# Patient Record
Sex: FEMALE | Race: WHITE | NOT HISPANIC OR LATINO | Employment: FULL TIME | URBAN - METROPOLITAN AREA
[De-identification: names, ages, dates, MRNs, and addresses within clinical notes are randomized per-mention and may not be internally consistent; named-entity substitution may affect disease eponyms.]

---

## 2018-05-03 ENCOUNTER — HISTORICAL (OUTPATIENT)
Dept: LAB | Facility: HOSPITAL | Age: 48
End: 2018-05-03

## 2018-05-03 LAB
ERYTHROCYTE [SEDIMENTATION RATE] IN BLOOD: 5 MM/HR (ref 0–20)
TSH SERPL-ACNC: 5.98 MIU/ML (ref 0.35–3.75)
URATE SERPL-MCNC: 4.4 MG/DL (ref 2.6–7.2)

## 2018-09-25 ENCOUNTER — HISTORICAL (OUTPATIENT)
Dept: LAB | Facility: HOSPITAL | Age: 48
End: 2018-09-25

## 2018-09-25 LAB — TSH SERPL-ACNC: 5.09 MIU/ML (ref 0.35–3.75)

## 2019-02-27 ENCOUNTER — HISTORICAL (OUTPATIENT)
Dept: LAB | Facility: HOSPITAL | Age: 49
End: 2019-02-27

## 2019-02-27 LAB — TSH SERPL-ACNC: 2.65 MIU/ML (ref 0.35–3.75)

## 2019-05-13 ENCOUNTER — HISTORICAL (OUTPATIENT)
Dept: RESPIRATORY THERAPY | Facility: HOSPITAL | Age: 49
End: 2019-05-13

## 2019-09-06 ENCOUNTER — HISTORICAL (OUTPATIENT)
Dept: LAB | Facility: HOSPITAL | Age: 49
End: 2019-09-06

## 2019-09-06 LAB — TSH SERPL-ACNC: 1.12 MIU/ML (ref 0.35–3.75)

## 2020-01-02 ENCOUNTER — HISTORICAL (OUTPATIENT)
Dept: RADIOLOGY | Facility: HOSPITAL | Age: 50
End: 2020-01-02

## 2020-06-05 ENCOUNTER — HISTORICAL (OUTPATIENT)
Dept: ADMINISTRATIVE | Facility: HOSPITAL | Age: 50
End: 2020-06-05

## 2020-06-05 LAB
ABS NEUT (OLG): 5.51 X10(3)/MCL (ref 2.1–9.2)
APTT PPP: 25.7 SEC (ref 23.4–34.9)
BASOPHILS # BLD AUTO: 0.03 X10(3)/MCL (ref 0–0.2)
BASOPHILS NFR BLD AUTO: 0.3 % (ref 0–1)
BUN SERPL-MCNC: 11.3 MG/DL (ref 9.8–20.1)
CALCIUM SERPL-MCNC: 9.8 MG/DL (ref 8.4–10.2)
CHLORIDE SERPL-SCNC: 103 MMOL/L (ref 98–107)
CO2 SERPL-SCNC: 30 MMOL/L (ref 22–29)
CREAT SERPL-MCNC: 0.65 MG/DL (ref 0.57–1.11)
CREAT/UREA NIT SERPL: 17
EOSINOPHIL # BLD AUTO: 0.06 X10(3)/MCL (ref 0–0.9)
EOSINOPHIL NFR BLD AUTO: 0.6 % (ref 0–6.4)
ERYTHROCYTE [DISTWIDTH] IN BLOOD BY AUTOMATED COUNT: 13.8 % (ref 11.5–17)
GLUCOSE SERPL-MCNC: 97 MG/DL (ref 74–100)
HCT VFR BLD AUTO: 43.2 % (ref 37–47)
HGB BLD-MCNC: 14.4 GM/DL (ref 12–16)
IMM GRANULOCYTES # BLD AUTO: 0.03 10*3/UL (ref 0–0.02)
IMM GRANULOCYTES NFR BLD AUTO: 0.3 % (ref 0–0.43)
INR PPP: 1 (ref 2–3)
LYMPHOCYTES # BLD AUTO: 2.9 X10(3)/MCL (ref 0.6–4.6)
LYMPHOCYTES NFR BLD AUTO: 31.2 % (ref 16–44)
MCH RBC QN AUTO: 33 PG (ref 27–31)
MCHC RBC AUTO-ENTMCNC: 33.3 GM/DL (ref 33–36)
MCV RBC AUTO: 99.1 FL (ref 80–94)
MONOCYTES # BLD AUTO: 0.77 X10(3)/MCL (ref 0.1–1.3)
MONOCYTES NFR BLD AUTO: 8.3 % (ref 4–12.1)
NEUTROPHILS # BLD AUTO: 5.51 X10(3)/MCL (ref 2.1–9.2)
NEUTROPHILS NFR BLD AUTO: 59.3 % (ref 43–73)
NRBC BLD AUTO-RTO: 0 % (ref 0–0.2)
PLATELET # BLD AUTO: 238 X10(3)/MCL (ref 130–400)
PMV BLD AUTO: 9.8 FL (ref 7.4–10.4)
POTASSIUM SERPL-SCNC: 4.4 MMOL/L (ref 3.5–5.1)
PROTHROMBIN TIME: 12.9 SEC (ref 11.7–14.5)
RBC # BLD AUTO: 4.36 X10(6)/MCL (ref 4.2–5.4)
SODIUM SERPL-SCNC: 139 MMOL/L (ref 136–145)
WBC # SPEC AUTO: 9.3 X10(3)/MCL (ref 4.5–11.5)

## 2020-06-10 ENCOUNTER — HISTORICAL (OUTPATIENT)
Dept: SURGERY | Facility: HOSPITAL | Age: 50
End: 2020-06-10

## 2020-07-02 ENCOUNTER — HISTORICAL (OUTPATIENT)
Dept: ADMINISTRATIVE | Facility: HOSPITAL | Age: 50
End: 2020-07-02

## 2020-07-23 ENCOUNTER — HISTORICAL (OUTPATIENT)
Dept: OCCUPATIONAL THERAPY | Facility: HOSPITAL | Age: 50
End: 2020-07-23

## 2020-07-27 ENCOUNTER — HISTORICAL (OUTPATIENT)
Dept: OCCUPATIONAL THERAPY | Facility: HOSPITAL | Age: 50
End: 2020-07-27

## 2020-07-30 ENCOUNTER — HISTORICAL (OUTPATIENT)
Dept: ADMINISTRATIVE | Facility: HOSPITAL | Age: 50
End: 2020-07-30

## 2020-08-05 ENCOUNTER — HISTORICAL (OUTPATIENT)
Dept: OCCUPATIONAL THERAPY | Facility: HOSPITAL | Age: 50
End: 2020-08-05

## 2020-08-07 ENCOUNTER — HISTORICAL (OUTPATIENT)
Dept: OCCUPATIONAL THERAPY | Facility: HOSPITAL | Age: 50
End: 2020-08-07

## 2020-08-10 ENCOUNTER — HISTORICAL (OUTPATIENT)
Dept: OCCUPATIONAL THERAPY | Facility: HOSPITAL | Age: 50
End: 2020-08-10

## 2020-08-12 ENCOUNTER — HISTORICAL (OUTPATIENT)
Dept: OCCUPATIONAL THERAPY | Facility: HOSPITAL | Age: 50
End: 2020-08-12

## 2020-08-14 ENCOUNTER — HISTORICAL (OUTPATIENT)
Dept: OCCUPATIONAL THERAPY | Facility: HOSPITAL | Age: 50
End: 2020-08-14

## 2020-08-17 ENCOUNTER — HISTORICAL (OUTPATIENT)
Dept: OCCUPATIONAL THERAPY | Facility: HOSPITAL | Age: 50
End: 2020-08-17

## 2020-08-19 ENCOUNTER — HISTORICAL (OUTPATIENT)
Dept: OCCUPATIONAL THERAPY | Facility: HOSPITAL | Age: 50
End: 2020-08-19

## 2020-08-21 ENCOUNTER — HISTORICAL (OUTPATIENT)
Dept: OCCUPATIONAL THERAPY | Facility: HOSPITAL | Age: 50
End: 2020-08-21

## 2020-08-24 ENCOUNTER — HISTORICAL (OUTPATIENT)
Dept: OCCUPATIONAL THERAPY | Facility: HOSPITAL | Age: 50
End: 2020-08-24

## 2020-08-26 ENCOUNTER — HISTORICAL (OUTPATIENT)
Dept: OCCUPATIONAL THERAPY | Facility: HOSPITAL | Age: 50
End: 2020-08-26

## 2020-08-31 ENCOUNTER — HISTORICAL (OUTPATIENT)
Dept: OCCUPATIONAL THERAPY | Facility: HOSPITAL | Age: 50
End: 2020-08-31

## 2020-09-02 ENCOUNTER — HISTORICAL (OUTPATIENT)
Dept: LAB | Facility: HOSPITAL | Age: 50
End: 2020-09-02

## 2020-09-02 ENCOUNTER — HISTORICAL (OUTPATIENT)
Dept: OCCUPATIONAL THERAPY | Facility: HOSPITAL | Age: 50
End: 2020-09-02

## 2020-09-02 LAB
CHOLEST SERPL-MCNC: 192 MG/DL
CHOLEST/HDLC SERPL: 5 {RATIO} (ref 0–5)
HDLC SERPL-MCNC: 40 MG/DL (ref 35–60)
LDLC SERPL CALC-MCNC: 130 MG/DL (ref 50–140)
TRIGL SERPL-MCNC: 110 MG/DL (ref 37–140)
TSH SERPL-ACNC: 3.05 UIU/ML (ref 0.35–4.94)
VLDLC SERPL CALC-MCNC: 22 MG/DL

## 2020-09-09 ENCOUNTER — HISTORICAL (OUTPATIENT)
Dept: OCCUPATIONAL THERAPY | Facility: HOSPITAL | Age: 50
End: 2020-09-09

## 2020-09-11 ENCOUNTER — HISTORICAL (OUTPATIENT)
Dept: OCCUPATIONAL THERAPY | Facility: HOSPITAL | Age: 50
End: 2020-09-11

## 2020-09-14 ENCOUNTER — HISTORICAL (OUTPATIENT)
Dept: OCCUPATIONAL THERAPY | Facility: HOSPITAL | Age: 50
End: 2020-09-14

## 2020-09-15 ENCOUNTER — HISTORICAL (OUTPATIENT)
Dept: ADMINISTRATIVE | Facility: HOSPITAL | Age: 50
End: 2020-09-15

## 2020-09-16 ENCOUNTER — HISTORICAL (OUTPATIENT)
Dept: OCCUPATIONAL THERAPY | Facility: HOSPITAL | Age: 50
End: 2020-09-16

## 2020-09-18 ENCOUNTER — HISTORICAL (OUTPATIENT)
Dept: OCCUPATIONAL THERAPY | Facility: HOSPITAL | Age: 50
End: 2020-09-18

## 2020-09-21 ENCOUNTER — HISTORICAL (OUTPATIENT)
Dept: OCCUPATIONAL THERAPY | Facility: HOSPITAL | Age: 50
End: 2020-09-21

## 2020-09-23 ENCOUNTER — HISTORICAL (OUTPATIENT)
Dept: OCCUPATIONAL THERAPY | Facility: HOSPITAL | Age: 50
End: 2020-09-23

## 2020-09-25 ENCOUNTER — HISTORICAL (OUTPATIENT)
Dept: OCCUPATIONAL THERAPY | Facility: HOSPITAL | Age: 50
End: 2020-09-25

## 2020-09-30 ENCOUNTER — HISTORICAL (OUTPATIENT)
Dept: OCCUPATIONAL THERAPY | Facility: HOSPITAL | Age: 50
End: 2020-09-30

## 2021-01-18 ENCOUNTER — HISTORICAL (OUTPATIENT)
Dept: RADIOLOGY | Facility: HOSPITAL | Age: 51
End: 2021-01-18

## 2021-03-05 ENCOUNTER — HISTORICAL (OUTPATIENT)
Dept: LAB | Facility: HOSPITAL | Age: 51
End: 2021-03-05

## 2021-03-05 LAB
ABS NEUT (OLG): 4.22
ALBUMIN SERPL-MCNC: 3.8 GM/DL (ref 3.5–5)
ALBUMIN/GLOB SERPL: 1.4 RATIO (ref 1.1–2)
ALP SERPL-CCNC: 78 UNIT/L (ref 40–150)
ALT SERPL-CCNC: 48 UNIT/L (ref 0–55)
AST SERPL-CCNC: 27 UNIT/L (ref 5–34)
BASOPHILS # BLD AUTO: 0.01 X10(3)/MCL
BASOPHILS NFR BLD AUTO: 0.1 %
BILIRUB SERPL-MCNC: 0.4 MG/DL
BILIRUBIN DIRECT+TOT PNL SERPL-MCNC: 0.2 MG/DL
BILIRUBIN DIRECT+TOT PNL SERPL-MCNC: 0.2 MG/DL (ref 0–0.5)
BUN SERPL-MCNC: 13 MG/DL (ref 9.8–20.1)
CALCIUM SERPL-MCNC: 8.9 MG/DL (ref 8.4–10.2)
CHLORIDE SERPL-SCNC: 109 MMOL/L (ref 98–107)
CHOLEST SERPL-MCNC: 169 MG/DL
CHOLEST/HDLC SERPL: 4 {RATIO} (ref 0–5)
CO2 SERPL-SCNC: 27 MEQ/L (ref 22–29)
CREAT SERPL-MCNC: 0.59 MG/DL (ref 0.55–1.02)
EOSINOPHIL # BLD AUTO: 0.09 X10(3)/MCL
EOSINOPHIL NFR BLD AUTO: 1.2 %
ERYTHROCYTE [DISTWIDTH] IN BLOOD BY AUTOMATED COUNT: 14 %
GLOBULIN SER-MCNC: 2.7 GM/DL (ref 2.4–3.5)
GLUCOSE SERPL-MCNC: 107 MG/DL (ref 74–100)
HCT VFR BLD AUTO: 41.6 % (ref 34–46)
HDLC SERPL-MCNC: 41 MG/DL (ref 35–60)
HGB BLD-MCNC: 13.2 GM/DL (ref 11.3–15.4)
IMM GRANULOCYTES # BLD AUTO: 0.02 10*3/UL (ref 0–0.1)
IMM GRANULOCYTES NFR BLD AUTO: 0.3 % (ref 0–1)
LDLC SERPL CALC-MCNC: 115 MG/DL (ref 50–140)
LYMPHOCYTES # BLD AUTO: 2.47 X10(3)/MCL
LYMPHOCYTES NFR BLD AUTO: 33.6 %
MCH RBC QN AUTO: 31.3 PG (ref 27–33)
MCHC RBC AUTO-ENTMCNC: 31.7 GM/DL (ref 32–35)
MCV RBC AUTO: 98.6 FL (ref 81–97)
MONOCYTES # BLD AUTO: 0.54 X10(3)/MCL
MONOCYTES NFR BLD AUTO: 7.3 %
NEUTROPHILS # BLD AUTO: 4.22 X10(3)/MCL
NEUTROPHILS NFR BLD AUTO: 57.5 %
PLATELET # BLD AUTO: 244 X10(3)/MCL (ref 140–450)
PMV BLD AUTO: 10 FL
POTASSIUM SERPL-SCNC: 4.3 MMOL/L (ref 3.5–5.1)
PROT SERPL-MCNC: 6.5 GM/DL (ref 6.4–8.3)
RBC # BLD AUTO: 4.22 X10(6)/MCL (ref 3.9–5)
SODIUM SERPL-SCNC: 141 MMOL/L (ref 136–145)
TSH SERPL-ACNC: 1.41 UIU/ML (ref 0.35–4.94)
VLDLC SERPL CALC-MCNC: 13 MG/DL
WBC # SPEC AUTO: 7.35 X10(3)/MCL (ref 3.4–9.2)

## 2021-06-04 ENCOUNTER — HISTORICAL (OUTPATIENT)
Dept: LAB | Facility: HOSPITAL | Age: 51
End: 2021-06-04

## 2021-06-04 LAB — TSH SERPL-ACNC: 1.7 UIU/ML (ref 0.35–4.94)

## 2022-02-02 ENCOUNTER — HISTORICAL (OUTPATIENT)
Dept: LAB | Facility: HOSPITAL | Age: 52
End: 2022-02-02

## 2022-02-02 LAB — TSH SERPL-ACNC: 0.42 M[IU]/L (ref 0.35–4.94)

## 2022-02-10 ENCOUNTER — HISTORICAL (OUTPATIENT)
Dept: ADMINISTRATIVE | Facility: HOSPITAL | Age: 52
End: 2022-02-10

## 2022-02-10 ENCOUNTER — HISTORICAL (OUTPATIENT)
Dept: RADIOLOGY | Facility: HOSPITAL | Age: 52
End: 2022-02-10

## 2022-02-21 LAB
HUMAN PAPILLOMAVIRUS (HPV): NORMAL
PAP RECOMMENDATION EXT: NORMAL
PAP SMEAR: NORMAL

## 2022-04-11 ENCOUNTER — HISTORICAL (OUTPATIENT)
Dept: ADMINISTRATIVE | Facility: HOSPITAL | Age: 52
End: 2022-04-11
Payer: COMMERCIAL

## 2022-04-27 VITALS
DIASTOLIC BLOOD PRESSURE: 80 MMHG | SYSTOLIC BLOOD PRESSURE: 130 MMHG | WEIGHT: 209.44 LBS | HEIGHT: 61 IN | BODY MASS INDEX: 39.54 KG/M2

## 2022-04-30 NOTE — OP NOTE
DATE OF SURGERY:    06/10/2020    SURGEON:  Brett Moreno MD  ASSISTANT:  Chantel Sampson    PREOPERATIVE DIAGNOSIS:  Left bimalleolar ankle fracture.    POSTOPERATIVE DIAGNOSIS:  Left bimalleolar ankle fracture.    PROCEDURE:  Open reduction internal fixation of left ankle fracture.     A certified assistant with a skilled set of hands was necessary for proper patient positioning, as well as assistance with holding retractors and closure.    IMPLANTS:  Include:  1. Arthrex 4.0 cannulated screws to the medial malleolus.  2. Arthrex 8-hole distal fibular plate to the lateral malleolus with cannulated screws and locking screws.    INDICATIONS FOR SURGERY:  The patient is a 49-year-old female who presented to my office.  She had a fractured ankle, underwent closed reduction in the ER.  The patient had a bimalleolar ankle fracture.  We discussed operative indications versus nonoperative treatment options.  The risks, benefits, and alternatives operative intervention were discussed in detail with the patient.  All questions were answered.  No guarantees were made.  Despite these risks, she elected to proceed with surgical intervention.    PROCEDURE IN DETAIL:  The patient was seen in the preop holding area, was marked and consented for surgery.  Taken to the operating room, placed under general endotracheal anesthesia.  The left lower extremity was prepped and draped in normal sterile fashion.  Time-out was performed, verifying correct patient, site, side, and procedure.  All were in agreement.  The leg was exsanguinated using an Esmarch tourniquet.  An incision was made over the medial malleolus, taken directly down to bone, spread down to the fracture.  We cleaned the fracture site out using irrigation, as well as curettes and a rongeur.  We were able to reduce the fracture anatomically while looking at the anterior medial ankle joint with a point-to-point clamp.  Clamp held the fracture in position and  appropriate reduction.  Two K-wires were placed across the fracture.  We drilled the near cortex and then we measured and placed 2 cannulated screws across the fracture, tightened sequentially to compress the fracture.  Released the point-to-point clamp.  Fracture alignment was maintained in anatomic position.  We irrigated this wound.  We closed this with 2-0 Vicryl and 3-0 nylon.       We then turned our attention toward preparation of the lateral malleolus.  Using fluoroscopic guidance, we made an incision overlying the lateral malleolus, taken directly down to bone.  Fracture was a distal fibula fracture that was transverse with a very long posterolateral spike.  This was unable to be anatomically reduced and unable to be lagged given the comminution of the fracture.  Therefore, it was bridged.  A distal third tibial plate was then placed in appropriate position.  We put 3 screws distally in locking fashion to hold the plate down.  We held traction on the plate, reduced the fibula, restored anatomic length judged on fluoroscopic views, as well as looking at the fracture.  We then placed a screw in the shaft, sucking the plate down, pushing it laterally as well.  We placed two more screws in the shaft proximally to the fracture.  Mortise was checked.  It was anatomic.  We evaluated the syndesmosis with a stress test and it was negative.  We then turned our attention towards closure.  We closed with 2-0 Vicryl and 3-0 nylon in interrupted fashion.  The patient was then placed in a well-padded short-leg splint.  She will be discharged home and will follow up with me in 2 weeks.        ______________________________  Brett Moreno MD    PCB/UD  DD:  06/10/2020  Time:  04:24PM  DT:  06/10/2020  Time:  04:54PM  Job #:  [id number]

## 2022-05-05 NOTE — HISTORICAL [facility] CERNER
This is a historical note converted from Oumar. Formatting and pictures may have been removed.  Please reference Oumar for original formatting and attached multimedia. Chief Complaint  6 WEEK F/U ORIF LEFT ANKLE SX ON 6/10/20 [facility]. HERE TODAY FOR X-RAYS.  History of Present Illness  49-year-old male presents here follow-up of open reduction and?fixation of her left ankle.? Patient is about 3 months out. ?Continues to have some issues. ?Has pain. ?Uses crutches for ambulation. ?Notes ongoing swelling in the ankle.  Review of Systems  Denies fevers, chills, chest pain, shortness of breath. Comprehensive review of systems performed and otherwise negative except as noted in HPI.  Physical Exam  Vitals & Measurements  T:?98.2? ?F (Oral)? BP:?123/72?  HT:?154.00?cm? WT:?92.000?kg? BMI:?38.79?  General: No acute distress, alert and oriented, healthy appearing?  HEENT: Head is atraumatic, mucous membranes are moist  Cardiovascular: Brisk capillary refill  Lungs: Breathing non-labored  Skin: no rashes appreciated  Neurologic: Sensation is grossly intact distally  ?   Left ankle:  Patient is incisions of healed well. ?Does have some swelling about her medial and lateral incision.? Brisk cap refill distally. ?Dorsiflexion?to 10. ?Plantar flexion to 20.  Assessment/Plan  1.?Closed bimalleolar fracture of left ankle?S82.842A  ?Patient status post ORIF of her left ankle.? Her fracture has healed however she continues to have pain and issues. ?Appears to be mainly coming from her ankle joint. ?May be having some synovitis versus early arthritic change. ?We will put her on some anti-inflammatories. ?Recommend continued physical therapy for stretching of her Achilles. ?If she continues to have issues we will get her set up for an injection?into her left ankle.  Ordered:  meloxicam, 15 mg = 1 tab(s), Oral, Daily, # 30 tab(s), 0 Refill(s), Pharmacy: [facility], 154, cm, Height/Length Dosing, 09/15/20 16:04:00  CDT, 92, kg, Weight Dosing, 09/15/20 16:04:00 CDT  Clinic Follow up, *Est. 10/27/20 3:00:00 CDT, Order for future visit, [facility]  Post-Op follow-up visit [id number] PC, Closed bimalleolar fracture of left ankle, [facility], 09/15/20 16:53:00 CDT  Post-Op follow-up visit [id number] PC, Closed bimalleolar fracture of left ankle, [facility], 09/15/20 16:38:00 CDT  XR Ankle Left Minimum 3 Views, Routine, 09/15/20 16:38:00 CDT, None, Stretcher, Patient Has IV?, Rad Type, Closed bimalleolar fracture of left ankle, Not Scheduled, 09/15/20 16:38:00 CDT  ?  Orders:  Physical Therapy Additional Tx, 09/25/20 14:00:00 CDT, Stop date 09/25/20 14:00:00 CDT  Referrals  Clinic Follow up, *Est. 10/27/20 3:00:00 CDT, Order for future visit, [facility]   Problem List/Past Medical History  Ongoing  Anxiety  Bone spur  Closed bimalleolar fracture of left ankle  Hypothyroid  Obesity  NATHANAEL - Obstructive sleep apnea  Historical  No qualifying data  Procedure/Surgical History  Open treatment of bimalleolar ankle fracture (eg, lateral and medial malleoli, or lateral and posterior malleoli, or medial and posterior malleoli), includes internal fixation, when performed (06/10/2020)  ORIF Ankle (Left) (06/10/2020)  Reposition Left Fibula with Internal Fixation Device, Open Approach (06/10/2020)  Reposition Left Tibia with Internal Fixation Device, Open Approach (06/10/2020)  Closed treatment of bimalleolar ankle fracture (eg, lateral and medial malleoli, or lateral and posterior malleoli or medial and posterior malleoli); with manipulation (06/04/2020)  Reposition Left Tibia, External Approach (06/04/2020)  Excision of lesion, tendon, tendon sheath, or capsule (including synovectomy) (eg, cyst or ganglion); foot. (06/21/2012)  Other local excision or destruction of lesion of joint of foot and toe (06/21/2012)  Appendectomy  LASIK  Tubal  ligation   Medications  alPRAzolam 0.25 mg oral tab, 0.25 mg= 1 tab(s), Oral, Daily  Effexor XR 75 mg oral capsule, extended release, 75 mg= 1 cap(s), Oral, Daily, 3 refills  levothyroxine 88 mcg (0.088 mg) oral tablet, 88 mcg= 1 tab(s), Oral, Daily, 3 refills  Mobic 15 mg oral tablet, 15 mg= 1 tab(s), Oral, Daily  Allergies  No Known Allergies  Social History  Abuse/Neglect  No, No, Yes, 09/15/2020  Alcohol  Employment/School  Employed, Work/School description: ., 05/06/2019  Exercise  Exercise duration: 0., 05/06/2019  Financial/Legal Situation  None, 09/01/2020  Home/Environment  Lives with Spouse., 05/06/2019    Never in , 09/01/2020  Nutrition/Health  Regular, 05/06/2019  Sexual  Sexually active: Yes., 05/06/2019  Spiritual/Cultural  Mu-ism, 10/28/2019  Substance Use  Never, 05/06/2019  Tobacco  10 or more cigarettes (1/2 pack or more)/day in last 30 days, Cigarettes, No, Household tobacco concerns: No. 17 Years (Age started)., 09/15/2020  Family History  Family history is negative  Immunizations  Vaccine Date Status   influenza virus vaccine, inactivated 10/29/2019 Recorded   Health Maintenance  Health Maintenance  ???Pending?(in the next year)  ??? ??OverDue  ??? ? ? ?Influenza Vaccine due??and every?  ??? ??Due?  ??? ? ? ?Colorectal Screening due??09/15/20??and every?  ??? ? ? ?Tetanus Vaccine due??09/15/20??and every 10??year(s)  ??? ? ? ?Zoster Vaccine due??09/15/20??and every?  ??? ??Due In Future?  ??? ? ? ?Obesity Screening not due until??01/01/21??and every 1??year(s)  ??? ? ? ?Smoking Cessation not due until??01/01/21??and every 1??year(s)  ??? ? ? ?Alcohol Misuse Screening not due until??01/02/21??and every 1??year(s)  ??? ? ? ?Depression Screening not due until??09/01/21??and every 1??year(s)  ??? ? ? ?ADL Screening not due until??09/01/21??and every 1??year(s)  ???Satisfied?(in the past 1 year)  ??? ??Satisfied?  ??? ? ? ?ADL Screening on??09/01/20.??Satisfied by  Renita Bobby LPN  ??? ? ? ?Alcohol Misuse Screening on??06/05/20.??Satisfied by Buffy Kuhn  ??? ? ? ?Blood Pressure Screening on??09/15/20.??Satisfied by Niki Cottrell  ??? ? ? ?Body Mass Index Check on??09/15/20.??Satisfied by Niki Cottrell  ??? ? ? ?Breast Cancer Screening on??01/02/20.??Satisfied by Martha Pabon  ??? ? ? ?Cervical Cancer Screening on??01/02/20.??Satisfied by Deysi Barnes  ??? ? ? ?Depression Screening on??09/01/20.??Satisfied by Renita Bobby LPN  ??? ? ? ?Diabetes Screening on??06/05/20.??Satisfied by Lo Ibarra  ??? ? ? ?Influenza Vaccine on??10/29/19.??Satisfied by Renita Bobby LPN  ??? ? ? ?Lipid Screening on??09/02/20.??Satisfied by Tiki Marie  ??? ? ? ?Obesity Screening on??09/15/20.??Satisfied by Niki Cottrell  ??? ? ? ?Smoking Cessation on??07/30/20.??Satisfied by Niki Cottrell  ?  Diagnostic Results  AP lateral left ankle reviewed. ?Patients fracture alignment is maintained. ?Interval callus and consolidation of her fracture seen. ?No loosening seen.

## 2022-05-05 NOTE — HISTORICAL [facility] CERNER
This is a historical note converted from Oumar. Formatting and pictures may have been removed.  Please reference Oumar for original formatting and attached multimedia. Chief Complaint  ORIF left ankle 6.10.20 gl 9.8.20. pt walking boot at all times. pt states she is doing well. She would like to discuss wearing a shorter walking boot,  History of Present Illness  49-year-old?female presents here for follow-up after bilateral ankle fracture fixation. ?Patient 3 weeks out. ?Overall she is doing fairly well. ?Has maintained nonweightbearing restrictions the left ankle. ?Pain is controlled and she is happy with her recovery thus far. ?She is using a?knee scooter currently.  Review of Systems  Denies fevers, chills, chest pain, shortness of breath. Comprehensive review of systems performed and otherwise negative except as noted in HPI.  Physical Exam  Vitals & Measurements  T:?97.2? ?F (Oral)?  HT:?154.94?cm? WT:?92.98?kg? BMI:?38.73?  General: No acute distress, alert and oriented, healthy appearing?  HEENT: Head is atraumatic, mucous membranes are moist  Cardiovascular: Brisk capillary refill  Lungs: Breathing non-labored  Skin: no rashes appreciated  Neurologic: Sensation is grossly intact distally  ?  Left ankle:  Brisk cap refill disappeared sensation tach disappeared range of motion of the left ankle is somewhat limited. ?She overall is developed an equinus contracture?and is?10 degrees?from neutral in terms of dorsiflexion.  Assessment/Plan  1.?Closed bimalleolar fracture of left ankle?S82.842A  ?Patients incisions have healed well. ?We will discontinue sutures today. ?I instructed on range of motion exercises to her left ankle she can begin.? We will also get her started with some physical therapy to work on range of motion given her significant?plantarflexion?contracture.? Plan to repeat an x-ray in another month. ?Continue nonweightbearing.   Problem List/Past Medical History  Ongoing  Anxiety  Bone  spur  Closed bimalleolar fracture of left ankle  Hypothyroid  Obesity  NATHANAEL - Obstructive sleep apnea  Historical  No qualifying data  Procedure/Surgical History  Open treatment of bimalleolar ankle fracture (eg, lateral and medial malleoli, or lateral and posterior malleoli, or medial and posterior malleoli), includes internal fixation, when performed (06/10/2020)  ORIF Ankle (Left) (06/10/2020)  Reposition Left Fibula with Internal Fixation Device, Open Approach (06/10/2020)  Reposition Left Tibia with Internal Fixation Device, Open Approach (06/10/2020)  Closed treatment of bimalleolar ankle fracture (eg, lateral and medial malleoli, or lateral and posterior malleoli or medial and posterior malleoli); with manipulation (06/04/2020)  Reposition Left Tibia, External Approach (06/04/2020)  Excision of lesion, tendon, tendon sheath, or capsule (including synovectomy) (eg, cyst or ganglion); foot. (06/21/2012)  Other local excision or destruction of lesion of joint of foot and toe (06/21/2012)  Appendectomy  LASIK  Tubal ligation   Medications  acetaminophen-hydrocodone 325 mg-5 mg oral tablet, 1 tab(s), Oral, q6hr,? ?Not Taking, Completed Rx  alPRAzolam 0.25 mg oral tab, 0.25 mg= 1 tab(s), Oral, Daily  aspirin 81 mg oral Delayed Release (EC) tablet, 81 mg= 1 tab(s), Oral, Daily,? ?Not Taking per Prescriber: post op medication  Effexor XR 75 mg oral capsule, extended release, 75 mg= 1 cap(s), Oral, Daily, 3 refills  levothyroxine 88 mcg (0.088 mg) oral tablet, 88 mcg= 1 tab(s), Oral, Daily, 3 refills  Norco 7.5 mg-325 mg oral tablet, 1 tab(s), Oral, q4hr, PRN,? ?Not Taking, Completed Rx  Allergies  No Known Allergies  Social History  Abuse/Neglect  No, No, Yes, 07/02/2020  No, No, Yes, 06/10/2020  Alcohol  Employment/School  Employed, Work/School description: ., 05/06/2019  Exercise  Exercise duration: 0., 05/06/2019  Home/Environment  Lives with Spouse., 05/06/2019  Nutrition/Health  Regular,  05/06/2019  Sexual  Sexually active: Yes., 05/06/2019  Spiritual/Cultural  Mormon, 10/28/2019  Substance Use  Never, 05/06/2019  Tobacco  10 or more cigarettes (1/2 pack or more)/day in last 30 days, No, 07/02/2020  10 or more cigarettes (1/2 pack or more)/day in last 30 days, Cigarettes, No, 17 Years (Age started)., 06/10/2020  Family History  Family history is negative  Immunizations  Vaccine Date Status   influenza virus vaccine, inactivated 10/29/2019 Recorded   Health Maintenance  Health Maintenance  ???Pending?(in the next year)  ??? ??OverDue  ??? ? ? ?Diabetes Screening due??and every?  ??? ? ? ?Smoking Cessation due??01/01/20??Variable frequency  ??? ? ? ?Lipid Screening due??03/10/20??and every?  ??? ? ? ?ADL Screening due??06/06/20??and every 1??year(s)  ??? ??Due?  ??? ? ? ?Colorectal Screening due??07/02/20??and every?  ??? ? ? ?Tetanus Vaccine due??07/02/20??and every 10??year(s)  ??? ??Due In Future?  ??? ? ? ?Alcohol Misuse Screening not due until??01/01/21??and every 1??year(s)  ??? ? ? ?Obesity Screening not due until??01/01/21??and every 1??year(s)  ??? ? ? ?Blood Pressure Screening not due until??06/05/21??and every 1??year(s)  ???Satisfied?(in the past 1 year)  ??? ??Satisfied?  ??? ? ? ?Alcohol Misuse Screening on??06/05/20.??Satisfied by Buffy Kuhn  ??? ? ? ?Blood Pressure Screening on??06/10/20.??Satisfied by Chetna Cox RN  ??? ? ? ?Body Mass Index Check on??07/02/20.??Satisfied by Ro Stein LPN  ??? ? ? ?Breast Cancer Screening on??01/02/20.??Satisfied by Martha Pabon  ??? ? ? ?Cervical Cancer Screening on??01/02/20.??Satisfied by Deysi Barnes  ??? ? ? ?Diabetes Screening on??06/05/20.??Satisfied by Lo Ibarra  ??? ? ? ?Influenza Vaccine on??10/29/19.??Satisfied by Renita Bobby LPN  ??? ? ? ?Obesity Screening on??07/02/20.??Satisfied by Ro Stein LPN.  ?  Diagnostic Results  AP lateral left ankle reviewed. ?Patients fracture alignment is  unchanged. ?Implants in good position.

## 2022-05-05 NOTE — HISTORICAL [facility] CERNER
This is a historical note converted from Oumar. Formatting and pictures may have been removed.  Please reference Oumar for original formatting and attached multimedia. Chief Complaint  LEFT ANKLE DISPLACED FRACTURE WAS TREATED IN THE ER ON 6/4/20. ?SHE STEPPED INTO A HOLE IN THE YARD. ?SHE IS IN A SHORT LEG SPLINT.  History of Present Illness  49-year-old female presents today for evaluation of left foot injury. ?Patient was walking in her?yard?when she stepped in a hole and twisted her left ankle. ?Noted to have a left fracture dislocation in the ER. ?She is undergone close reduction of this.? Currently patient complains of significant pain to the left ankle. ?She has been nonweightbearing. ?Denies any proximal?pain or pain in other extremities. ?Pain is sharp in nature. ?Improved by rest.  Review of Systems  Denies fevers, chills, chest pain, shortness of breath. Comprehensive review of systems performed and otherwise negative except as noted in HPI.  Physical Exam  Vitals & Measurements  T:?98.1? ?F (Oral)? BP:?126/66?  HT:?158?cm? WT:?96?kg? BMI:?38.46?  General: No acute distress, alert and oriented, healthy appearing?  HEENT: Head is atraumatic, mucous membranes are moist  Cardiovascular: Brisk capillary refill  Lungs: Breathing non-labored  Skin: no rashes appreciated  Neurologic: Sensation is grossly intact distally  ?  Left foot:  Patient is in a well-padded short leg splint. ?She has brisk capillary refill distally. ?Positive FHL/EHL.? No significant swelling to the toes. ?No significant swelling to her calf.  Assessment/Plan  1.?Bimalleolar fracture of left ankle?S82.842A  ?Patient with left displaced bimalleolar ankle fracture.? Reduction is adequate although not anatomic.? Instructed the patient on?elevation and she is to have strict elevation for the next?7 to 10 days.? Given the bimalleolar nature of the fracture as well as?displaced nature, patient require operative fixation with ORIF.? The risk,  benefits, alternatives to ORIF of her left ankle were discussed in detail including but limited to infection, bleeding, scarring, need revision surgery, and conversion to pain, DVT, PE, death. ?Despite these risk he like to proceed with surgical invention. ?Plan for ORIF of her left ankle next Wednesday. ?Patient is aware that if she has significant or further swelling?next Wednesday, may require external fixation of her left ankle.? She is to remain strictly nonweightbearing?with strict elevation until surgery.  Orders:  XR Ankle Left Minimum 3 Views, Routine, 06/05/20 9:43:00 CDT, None, Stretcher, Patient Has IV?, Rad Type, Ankle fracture, left, Not Scheduled, 06/05/20 9:43:00 CDT   Problem List/Past Medical History  Ongoing  Anxiety  Bone spur  Hypothyroid  Obesity  Historical  No qualifying data  Procedure/Surgical History  Excision of lesion, tendon, tendon sheath, or capsule (including synovectomy) (eg, cyst or ganglion); foot. (06/21/2012)  Other local excision or destruction of lesion of joint of foot and toe (06/21/2012)  Appendectomy  LASIK  Tubal ligation   Medications  alPRAzolam 0.25 mg oral tab, 0.25 mg= 1 tab(s), Oral, Daily  Effexor XR 75 mg oral capsule, extended release, 75 mg= 1 cap(s), Oral, Daily, 3 refills  levothyroxine 88 mcg (0.088 mg) oral tablet, 88 mcg= 1 tab(s), Oral, Daily, 3 refills  Norco 5 mg-325 mg oral tablet, 1 tab(s), Oral, q4hr, PRN  Allergies  No Known Allergies  Social History  Abuse/Neglect  No, 06/05/2020  Alcohol  Employment/School  Employed, Work/School description: ., 05/06/2019  Exercise  Exercise duration: 0., 05/06/2019  Home/Environment  Lives with Spouse., 05/06/2019  Nutrition/Health  Regular, 05/06/2019  Sexual  Sexually active: Yes., 05/06/2019  Spiritual/Cultural  Evangelical, 10/28/2019  Substance Use  Never, 05/06/2019  Tobacco  10 or more cigarettes (1/2 pack or more)/day in last 30 days, Cigarettes, No, 17 Years (Age started).,  06/05/2020  Family History  Family history is negative  Immunizations  Vaccine Date Status   influenza virus vaccine, inactivated 10/29/2019 Recorded   Health Maintenance  Health Maintenance  ???Pending?(in the next year)  ??? ??OverDue  ??? ? ? ?Diabetes Screening due??and every?  ??? ? ? ?Smoking Cessation due??01/01/20??Variable frequency  ??? ? ? ?Lipid Screening due??03/10/20??and every?  ??? ??Due?  ??? ? ? ?Depression Screening due??05/05/20??and every 1??year(s)  ??? ? ? ?Colorectal Screening due??06/05/20??and every?  ??? ? ? ?Tetanus Vaccine due??06/05/20??and every 10??year(s)  ??? ??Due In Future?  ??? ? ? ?ADL Screening not due until??06/06/20??and every 1??year(s)  ??? ? ? ?Alcohol Misuse Screening not due until??01/01/21??and every 1??year(s)  ??? ? ? ?Obesity Screening not due until??01/01/21??and every 1??year(s)  ???Satisfied?(in the past 1 year)  ??? ??Satisfied?  ??? ? ? ?Alcohol Misuse Screening on??06/05/20.??Satisfied by Buffy Kuhn  ??? ? ? ?Blood Pressure Screening on??06/05/20.??Satisfied by Buffy Kuhn  ??? ? ? ?Body Mass Index Check on??06/05/20.??Satisfied by Bam Buffy  ??? ? ? ?Breast Cancer Screening on??01/02/20.??Satisfied by Martha Pabon  ??? ? ? ?Cervical Cancer Screening on??01/02/20.??Satisfied by Deysi Barnes  ??? ? ? ?Influenza Vaccine on??10/29/19.??Satisfied by Renita Bobby LPN  ??? ? ? ?Obesity Screening on??06/05/20.??Satisfied by Bam, Buffy  ?  Diagnostic Results  AP lateral left ankle reviewed. ?Patients fracture?alignment is adequate although she her talus does continue to be somewhat?laterally shifted.

## 2022-05-05 NOTE — HISTORICAL [facility]
This is a historical note converted from [facility]. Formatting and pictures may have been removed.  Please reference Oumar for original formatting and attached multimedia. Chief Complaint  1 MONTH F/U ORIF LEFT ANKLE ON 6/10/20 GLOBAL 9/8/20. HERE TODAY FOR X-RAYS.  History of Present Illness  49-year-old female presents here follow-up with ORIF of her by bimalleolar ankle fracture. ?Over the patient doing very well. ?Pain is controlled. ?She has been compliant with nonweightbearing restrictions.  Review of Systems  Denies fevers, chills, chest pain, shortness of breath. Comprehensive review of systems performed and otherwise negative except as noted in HPI.  Physical Exam  Vitals & Measurements  T:?97.7? ?F (Oral)? BP:?116/83?  HT:?154.94?cm? WT:?38.980?kg? BMI:?16.24?  General: No acute distress, alert and oriented, healthy appearing?  HEENT: Head is atraumatic, mucous membranes are moist  Cardiovascular: Brisk capillary refill  Lungs: Breathing non-labored  Skin: no rashes appreciated  Neurologic: Sensation is grossly intact distally  ?  Left foot:  Incisions are clean and dry. ?She has brisk cap refill distally. ?Sensation tach distally.? Patient can dorsiflex to neutral. ?Good plantar flexion.  Assessment/Plan  1.?Closed bimalleolar fracture of left ankle?S82.842A  ?Patients fracture is gone on to union. ?We will lowered again weightbearing with physical therapy and wean out of the boot as tolerated. ?We will see her back in 6 weeks with repeat x-rays of her left foot.   Problem List/Past Medical History  Ongoing  Anxiety  Bone spur  Closed bimalleolar fracture of left ankle  Hypothyroid  Obesity  NATHANAEL - Obstructive sleep apnea  Historical  No qualifying data  Procedure/Surgical History  Open treatment of bimalleolar ankle fracture (eg, lateral and medial malleoli, or lateral and posterior malleoli, or medial and posterior malleoli), includes internal fixation, when performed (06/10/2020)  ORIF Ankle (Left)  (06/10/2020)  Reposition Left Fibula with Internal Fixation Device, Open Approach (06/10/2020)  Reposition Left Tibia with Internal Fixation Device, Open Approach (06/10/2020)  Closed treatment of bimalleolar ankle fracture (eg, lateral and medial malleoli, or lateral and posterior malleoli or medial and posterior malleoli); with manipulation (06/04/2020)  Reposition Left Tibia, External Approach (06/04/2020)  Excision of lesion, tendon, tendon sheath, or capsule (including synovectomy) (eg, cyst or ganglion); foot. (06/21/2012)  Other local excision or destruction of lesion of joint of foot and toe (06/21/2012)  Appendectomy  LASIK  Tubal ligation   Medications  acetaminophen-hydrocodone 325 mg-5 mg oral tablet, 1 tab(s), Oral, q6hr,? ?Not Taking, Completed Rx  alPRAzolam 0.25 mg oral tab, 0.25 mg= 1 tab(s), Oral, Daily  aspirin 81 mg oral Delayed Release (EC) tablet, 81 mg= 1 tab(s), Oral, Daily,? ?Not Taking per Prescriber: post op medication  Effexor XR 75 mg oral capsule, extended release, 75 mg= 1 cap(s), Oral, Daily, 3 refills  levothyroxine 88 mcg (0.088 mg) oral tablet, 88 mcg= 1 tab(s), Oral, Daily, 3 refills  Norco 7.5 mg-325 mg oral tablet, 1 tab(s), Oral, q4hr, PRN,? ?Not Taking, Completed Rx  Allergies  No Known Allergies  Social History  Abuse/Neglect  No, No, Yes, 07/30/2020  Alcohol  Employment/School  Employed, Work/School description: ., 05/06/2019  Exercise  Exercise duration: 0., 05/06/2019  Home/Environment  Lives with Spouse., 05/06/2019  Nutrition/Health  Regular, 05/06/2019  Sexual  Sexually active: Yes., 05/06/2019  Spiritual/Cultural  Latter day, 10/28/2019  Substance Use  Never, 05/06/2019  Tobacco  10 or more cigarettes (1/2 pack or more)/day in last 30 days, Cigarettes, No, Household tobacco concerns: No. 17 Years (Age started)., 07/30/2020  Family History  Family history is negative  Immunizations  Vaccine Date Status   influenza virus vaccine, inactivated 10/29/2019  Recorded   Health Maintenance  Health Maintenance  ???Pending?(in the next year)  ??? ??OverDue  ??? ? ? ?Influenza Vaccine due??and every?  ??? ? ? ?Lipid Screening due??03/10/20??and every?  ??? ? ? ?Depression Screening due??05/05/20??and every 1??year(s)  ??? ? ? ?ADL Screening due??06/06/20??and every 1??year(s)  ??? ??Due?  ??? ? ? ?Colorectal Screening due??07/30/20??and every?  ??? ? ? ?Tetanus Vaccine due??07/30/20??and every 10??year(s)  ??? ? ? ?Zoster Vaccine due??07/30/20??and every?  ??? ??Due In Future?  ??? ? ? ?Obesity Screening not due until??01/01/21??and every 1??year(s)  ??? ? ? ?Smoking Cessation not due until??01/01/21??and every 1??year(s)  ??? ? ? ?Alcohol Misuse Screening not due until??01/02/21??and every 1??year(s)  ???Satisfied?(in the past 1 year)  ??? ??Satisfied?  ??? ? ? ?Alcohol Misuse Screening on??06/05/20.??Satisfied by Buffy Kuhn  ??? ? ? ?Blood Pressure Screening on??07/30/20.??Satisfied by Niki Cottrell  ??? ? ? ?Body Mass Index Check on??07/30/20.??Satisfied by Niki Cottrell  ??? ? ? ?Breast Cancer Screening on??01/02/20.??Satisfied by Martha Pabon  ??? ? ? ?Cervical Cancer Screening on??01/02/20.??Satisfied by Deysi Barnes  ??? ? ? ?Diabetes Screening on??06/05/20.??Satisfied by Lo Ibarra  ??? ? ? ?Influenza Vaccine on??10/29/19.??Satisfied by Kanu LPN, Renita  ??? ? ? ?Obesity Screening on??07/30/20.??Satisfied by Niki Cottrell  ??? ? ? ?Smoking Cessation on??07/30/20.??Satisfied by Niki Cottrell  ?  Diagnostic Results  AP lateral left ankle reviewed. ?Patients fracture alignment is unchanged. ?Interval consolidation of her fracture line.

## 2022-05-09 DIAGNOSIS — Z00.00 WELLNESS EXAMINATION: ICD-10-CM

## 2022-05-09 DIAGNOSIS — E03.9 HYPOTHYROIDISM, UNSPECIFIED TYPE: ICD-10-CM

## 2022-05-09 DIAGNOSIS — Z13.6 SCREENING FOR CARDIOVASCULAR CONDITION: Primary | ICD-10-CM

## 2022-05-24 ENCOUNTER — LAB VISIT (OUTPATIENT)
Dept: LAB | Facility: HOSPITAL | Age: 52
End: 2022-05-24
Attending: NURSE PRACTITIONER
Payer: COMMERCIAL

## 2022-05-24 DIAGNOSIS — Z00.00 WELLNESS EXAMINATION: ICD-10-CM

## 2022-05-24 DIAGNOSIS — Z13.6 SCREENING FOR CARDIOVASCULAR CONDITION: ICD-10-CM

## 2022-05-24 DIAGNOSIS — E66.8 OBESITY OF ENDOCRINE ORIGIN: ICD-10-CM

## 2022-05-24 DIAGNOSIS — E03.9 HYPOTHYROIDISM, UNSPECIFIED TYPE: ICD-10-CM

## 2022-05-24 DIAGNOSIS — Z79.899 ENCOUNTER FOR LONG-TERM (CURRENT) USE OF OTHER MEDICATIONS: ICD-10-CM

## 2022-05-24 DIAGNOSIS — N95.1 SYMPTOMATIC MENOPAUSAL OR FEMALE CLIMACTERIC STATES: Primary | ICD-10-CM

## 2022-05-24 LAB
ALBUMIN SERPL-MCNC: 3.8 GM/DL (ref 3.5–5)
ALBUMIN/GLOB SERPL: 1.3 RATIO (ref 1.1–2)
ALP SERPL-CCNC: 78 UNIT/L (ref 40–150)
ALT SERPL-CCNC: 32 UNIT/L (ref 0–55)
AST SERPL-CCNC: 21 UNIT/L (ref 5–34)
BASOPHILS # BLD AUTO: 0.03 X10(3)/MCL (ref 0–0.2)
BASOPHILS NFR BLD AUTO: 0.5 %
BILIRUBIN DIRECT+TOT PNL SERPL-MCNC: 0.5 MG/DL
BUN SERPL-MCNC: 8 MG/DL (ref 9.8–20.1)
CALCIUM SERPL-MCNC: 9.3 MG/DL (ref 8.4–10.2)
CHLORIDE SERPL-SCNC: 104 MMOL/L (ref 98–107)
CHOLEST SERPL-MCNC: 187 MG/DL
CHOLEST/HDLC SERPL: 5 {RATIO} (ref 0–5)
CO2 SERPL-SCNC: 28 MMOL/L (ref 22–29)
CREAT SERPL-MCNC: 0.66 MG/DL (ref 0.55–1.02)
EOSINOPHIL # BLD AUTO: 0.08 X10(3)/MCL (ref 0–0.9)
EOSINOPHIL NFR BLD AUTO: 1.3 %
ERYTHROCYTE [DISTWIDTH] IN BLOOD BY AUTOMATED COUNT: 13.2 % (ref 11.5–17)
ESTRADIOL SERPL HS-MCNC: <24 PG/ML
FSH SERPL-ACNC: 70.15 MIU/ML
GLOBULIN SER-MCNC: 3 GM/DL (ref 2.4–3.5)
GLUCOSE SERPL-MCNC: 98 MG/DL (ref 74–100)
HCT VFR BLD AUTO: 43.4 % (ref 37–47)
HDLC SERPL-MCNC: 38 MG/DL (ref 35–60)
HGB BLD-MCNC: 13.5 GM/DL (ref 12–16)
IMM GRANULOCYTES # BLD AUTO: 0.01 X10(3)/MCL (ref 0–0.02)
IMM GRANULOCYTES NFR BLD AUTO: 0.2 % (ref 0–0.43)
LDLC SERPL CALC-MCNC: 129 MG/DL (ref 50–140)
LYMPHOCYTES # BLD AUTO: 2.23 X10(3)/MCL (ref 0.6–4.6)
LYMPHOCYTES NFR BLD AUTO: 37.2 %
MCH RBC QN AUTO: 30.8 PG (ref 27–31)
MCHC RBC AUTO-ENTMCNC: 31.1 MG/DL (ref 33–36)
MCV RBC AUTO: 98.9 FL (ref 80–94)
MONOCYTES # BLD AUTO: 0.42 X10(3)/MCL (ref 0.1–1.3)
MONOCYTES NFR BLD AUTO: 7 %
NEUTROPHILS # BLD AUTO: 3.2 X10(3)/MCL (ref 2.1–9.2)
NEUTROPHILS NFR BLD AUTO: 53.8 %
NRBC BLD AUTO-RTO: 0 %
PLATELET # BLD AUTO: 274 X10(3)/MCL (ref 130–400)
PMV BLD AUTO: 9.8 FL (ref 9.4–12.4)
POTASSIUM SERPL-SCNC: 4.2 MMOL/L (ref 3.5–5.1)
PROT SERPL-MCNC: 6.8 GM/DL (ref 6.4–8.3)
RBC # BLD AUTO: 4.39 X10(6)/MCL (ref 4.2–5.4)
SODIUM SERPL-SCNC: 143 MMOL/L (ref 136–145)
T3FREE SERPL-MCNC: 2.39 PG/ML (ref 1.57–3.91)
T4 FREE SERPL-MCNC: 1.21 NG/DL (ref 0.7–1.48)
TESTOST SERPL-MCNC: 21.82 NG/DL (ref 12.4–35.75)
TRIGL SERPL-MCNC: 98 MG/DL (ref 37–140)
TSH SERPL-ACNC: 1.04 UIU/ML (ref 0.35–4.94)
VLDLC SERPL CALC-MCNC: 20 MG/DL
WBC # SPEC AUTO: 6 X10(3)/MCL (ref 4.5–11.5)

## 2022-05-24 PROCEDURE — 84439 ASSAY OF FREE THYROXINE: CPT

## 2022-05-24 PROCEDURE — 83525 ASSAY OF INSULIN: CPT

## 2022-05-24 PROCEDURE — 83001 ASSAY OF GONADOTROPIN (FSH): CPT

## 2022-05-24 PROCEDURE — 80061 LIPID PANEL: CPT

## 2022-05-24 PROCEDURE — 82670 ASSAY OF TOTAL ESTRADIOL: CPT

## 2022-05-24 PROCEDURE — 84443 ASSAY THYROID STIM HORMONE: CPT

## 2022-05-24 PROCEDURE — 84403 ASSAY OF TOTAL TESTOSTERONE: CPT

## 2022-05-24 PROCEDURE — 36415 COLL VENOUS BLD VENIPUNCTURE: CPT

## 2022-05-24 PROCEDURE — 84481 FREE ASSAY (FT-3): CPT

## 2022-05-24 PROCEDURE — 80053 COMPREHEN METABOLIC PANEL: CPT

## 2022-05-24 PROCEDURE — 84075 ASSAY ALKALINE PHOSPHATASE: CPT

## 2022-05-24 PROCEDURE — 85025 COMPLETE CBC W/AUTO DIFF WBC: CPT

## 2022-05-25 DIAGNOSIS — Z11.4 ENCOUNTER FOR SCREENING FOR HIV: Primary | ICD-10-CM

## 2022-05-25 DIAGNOSIS — Z11.59 NEED FOR HEPATITIS C SCREENING TEST: ICD-10-CM

## 2022-05-25 LAB — INSULIN P FAST SERPL-ACNC: 23.1 MCIU/ML (ref 2.6–24.9)

## 2022-09-13 DIAGNOSIS — Z01.818 OTHER SPECIFIED PRE-OPERATIVE EXAMINATION: Primary | ICD-10-CM

## 2022-09-15 ENCOUNTER — HOSPITAL ENCOUNTER (OUTPATIENT)
Dept: RADIOLOGY | Facility: HOSPITAL | Age: 52
Discharge: HOME | End: 2022-09-15
Payer: COMMERCIAL

## 2022-09-15 DIAGNOSIS — Z01.818 PREOPERATIVE EXAMINATION, UNSPECIFIED: Primary | ICD-10-CM

## 2022-09-15 DIAGNOSIS — Z01.818 PREOPERATIVE EXAMINATION, UNSPECIFIED: ICD-10-CM

## 2022-09-15 RX ORDER — LEVOTHYROXINE SODIUM 100 UG/1
100 TABLET ORAL
COMMUNITY
End: 2024-01-23 | Stop reason: SDUPTHER

## 2022-09-20 ENCOUNTER — ANESTHESIA (OUTPATIENT)
Dept: SURGERY | Facility: HOSPITAL | Age: 52
End: 2022-09-20
Payer: COMMERCIAL

## 2022-09-20 ENCOUNTER — HOSPITAL ENCOUNTER (OUTPATIENT)
Facility: HOSPITAL | Age: 52
Discharge: HOME | End: 2022-09-20
Attending: STUDENT IN AN ORGANIZED HEALTH CARE EDUCATION/TRAINING PROGRAM | Admitting: STUDENT IN AN ORGANIZED HEALTH CARE EDUCATION/TRAINING PROGRAM
Payer: COMMERCIAL

## 2022-09-20 ENCOUNTER — ANESTHESIA EVENT (OUTPATIENT)
Dept: SURGERY | Facility: HOSPITAL | Age: 52
End: 2022-09-20
Payer: COMMERCIAL

## 2022-09-20 DIAGNOSIS — N95.0 POSTMENOPAUSAL BLEEDING: Primary | ICD-10-CM

## 2022-09-20 LAB
B-HCG UR QL: NEGATIVE
CTP QC/QA: YES

## 2022-09-20 PROCEDURE — 37000009 HC ANESTHESIA EA ADD 15 MINS: Performed by: STUDENT IN AN ORGANIZED HEALTH CARE EDUCATION/TRAINING PROGRAM

## 2022-09-20 PROCEDURE — 63600175 PHARM REV CODE 636 W HCPCS: Performed by: NURSE ANESTHETIST, CERTIFIED REGISTERED

## 2022-09-20 PROCEDURE — 36000706: Performed by: STUDENT IN AN ORGANIZED HEALTH CARE EDUCATION/TRAINING PROGRAM

## 2022-09-20 PROCEDURE — 71000015 HC POSTOP RECOV 1ST HR: Performed by: STUDENT IN AN ORGANIZED HEALTH CARE EDUCATION/TRAINING PROGRAM

## 2022-09-20 PROCEDURE — 71000016 HC POSTOP RECOV ADDL HR: Performed by: STUDENT IN AN ORGANIZED HEALTH CARE EDUCATION/TRAINING PROGRAM

## 2022-09-20 PROCEDURE — 25000003 PHARM REV CODE 250: Performed by: STUDENT IN AN ORGANIZED HEALTH CARE EDUCATION/TRAINING PROGRAM

## 2022-09-20 PROCEDURE — 81025 URINE PREGNANCY TEST: CPT | Performed by: STUDENT IN AN ORGANIZED HEALTH CARE EDUCATION/TRAINING PROGRAM

## 2022-09-20 PROCEDURE — 71000033 HC RECOVERY, INTIAL HOUR: Performed by: STUDENT IN AN ORGANIZED HEALTH CARE EDUCATION/TRAINING PROGRAM

## 2022-09-20 PROCEDURE — 63600175 PHARM REV CODE 636 W HCPCS

## 2022-09-20 PROCEDURE — 36000707: Performed by: STUDENT IN AN ORGANIZED HEALTH CARE EDUCATION/TRAINING PROGRAM

## 2022-09-20 PROCEDURE — 25000003 PHARM REV CODE 250: Performed by: NURSE ANESTHETIST, CERTIFIED REGISTERED

## 2022-09-20 PROCEDURE — 37000008 HC ANESTHESIA 1ST 15 MINUTES: Performed by: STUDENT IN AN ORGANIZED HEALTH CARE EDUCATION/TRAINING PROGRAM

## 2022-09-20 PROCEDURE — C1782 MORCELLATOR: HCPCS | Performed by: STUDENT IN AN ORGANIZED HEALTH CARE EDUCATION/TRAINING PROGRAM

## 2022-09-20 PROCEDURE — 27201423 OPTIME MED/SURG SUP & DEVICES STERILE SUPPLY: Performed by: STUDENT IN AN ORGANIZED HEALTH CARE EDUCATION/TRAINING PROGRAM

## 2022-09-20 RX ORDER — ACETAMINOPHEN 10 MG/ML
1000 INJECTION, SOLUTION INTRAVENOUS ONCE
Status: DISCONTINUED | OUTPATIENT
Start: 2022-09-20 | End: 2022-09-20 | Stop reason: HOSPADM

## 2022-09-20 RX ORDER — KETOROLAC TROMETHAMINE 30 MG/ML
15 INJECTION, SOLUTION INTRAMUSCULAR; INTRAVENOUS EVERY 6 HOURS PRN
Status: DISCONTINUED | OUTPATIENT
Start: 2022-09-20 | End: 2022-09-20 | Stop reason: HOSPADM

## 2022-09-20 RX ORDER — MIDAZOLAM HYDROCHLORIDE 1 MG/ML
INJECTION INTRAMUSCULAR; INTRAVENOUS
Status: COMPLETED
Start: 2022-09-20 | End: 2022-09-20

## 2022-09-20 RX ORDER — ONDANSETRON HYDROCHLORIDE 2 MG/ML
INJECTION, SOLUTION INTRAMUSCULAR; INTRAVENOUS
Status: DISCONTINUED | OUTPATIENT
Start: 2022-09-20 | End: 2022-09-20

## 2022-09-20 RX ORDER — SODIUM CHLORIDE, SODIUM LACTATE, POTASSIUM CHLORIDE, CALCIUM CHLORIDE 600; 310; 30; 20 MG/100ML; MG/100ML; MG/100ML; MG/100ML
1000 INJECTION, SOLUTION INTRAVENOUS CONTINUOUS
Status: CANCELLED | OUTPATIENT
Start: 2022-09-20

## 2022-09-20 RX ORDER — ONDANSETRON 4 MG/1
8 TABLET, ORALLY DISINTEGRATING ORAL EVERY 8 HOURS PRN
Status: DISCONTINUED | OUTPATIENT
Start: 2022-09-20 | End: 2022-09-20 | Stop reason: HOSPADM

## 2022-09-20 RX ORDER — MIDAZOLAM HYDROCHLORIDE 1 MG/ML
2 INJECTION INTRAMUSCULAR; INTRAVENOUS ONCE
Status: COMPLETED | OUTPATIENT
Start: 2022-09-20 | End: 2022-09-20

## 2022-09-20 RX ORDER — PROPOFOL 10 MG/ML
VIAL (ML) INTRAVENOUS
Status: DISCONTINUED | OUTPATIENT
Start: 2022-09-20 | End: 2022-09-20

## 2022-09-20 RX ORDER — MEPERIDINE HYDROCHLORIDE 25 MG/ML
12.5 INJECTION INTRAMUSCULAR; INTRAVENOUS; SUBCUTANEOUS ONCE AS NEEDED
Status: DISCONTINUED | OUTPATIENT
Start: 2022-09-20 | End: 2022-09-20 | Stop reason: HOSPADM

## 2022-09-20 RX ORDER — LIDOCAINE HYDROCHLORIDE 10 MG/ML
INJECTION, SOLUTION EPIDURAL; INFILTRATION; INTRACAUDAL; PERINEURAL
Status: DISCONTINUED | OUTPATIENT
Start: 2022-09-20 | End: 2022-09-20

## 2022-09-20 RX ORDER — IPRATROPIUM BROMIDE AND ALBUTEROL SULFATE 2.5; .5 MG/3ML; MG/3ML
3 SOLUTION RESPIRATORY (INHALATION) ONCE AS NEEDED
Status: DISCONTINUED | OUTPATIENT
Start: 2022-09-20 | End: 2022-09-20 | Stop reason: HOSPADM

## 2022-09-20 RX ORDER — ONDANSETRON 4 MG/1
4 TABLET, ORALLY DISINTEGRATING ORAL EVERY 6 HOURS PRN
Status: CANCELLED | OUTPATIENT
Start: 2022-09-20

## 2022-09-20 RX ORDER — HYDROMORPHONE HYDROCHLORIDE 2 MG/ML
0.2 INJECTION, SOLUTION INTRAMUSCULAR; INTRAVENOUS; SUBCUTANEOUS EVERY 5 MIN PRN
Status: DISCONTINUED | OUTPATIENT
Start: 2022-09-20 | End: 2022-09-20 | Stop reason: HOSPADM

## 2022-09-20 RX ORDER — SILVER NITRATE 38.21; 12.74 MG/1; MG/1
STICK TOPICAL
Status: DISCONTINUED
Start: 2022-09-20 | End: 2022-09-20 | Stop reason: HOSPADM

## 2022-09-20 RX ORDER — MIDAZOLAM HYDROCHLORIDE 1 MG/ML
2 INJECTION INTRAMUSCULAR; INTRAVENOUS ONCE AS NEEDED
Status: CANCELLED | OUTPATIENT
Start: 2022-09-20 | End: 2034-02-15

## 2022-09-20 RX ORDER — DEXAMETHASONE SODIUM PHOSPHATE 4 MG/ML
INJECTION, SOLUTION INTRA-ARTICULAR; INTRALESIONAL; INTRAMUSCULAR; INTRAVENOUS; SOFT TISSUE
Status: DISCONTINUED | OUTPATIENT
Start: 2022-09-20 | End: 2022-09-20

## 2022-09-20 RX ORDER — FENTANYL CITRATE 50 UG/ML
INJECTION, SOLUTION INTRAMUSCULAR; INTRAVENOUS
Status: DISCONTINUED | OUTPATIENT
Start: 2022-09-20 | End: 2022-09-20

## 2022-09-20 RX ORDER — SILVER NITRATE 38.21; 12.74 MG/1; MG/1
STICK TOPICAL
Status: DISCONTINUED | OUTPATIENT
Start: 2022-09-20 | End: 2022-09-20 | Stop reason: HOSPADM

## 2022-09-20 RX ORDER — IBUPROFEN 600 MG/1
600 TABLET ORAL EVERY 6 HOURS PRN
Status: DISCONTINUED | OUTPATIENT
Start: 2022-09-20 | End: 2022-09-20 | Stop reason: HOSPADM

## 2022-09-20 RX ORDER — LIDOCAINE HYDROCHLORIDE 10 MG/ML
1 INJECTION, SOLUTION EPIDURAL; INFILTRATION; INTRACAUDAL; PERINEURAL ONCE
Status: CANCELLED | OUTPATIENT
Start: 2022-09-20 | End: 2022-09-20

## 2022-09-20 RX ORDER — SODIUM CITRATE AND CITRIC ACID MONOHYDRATE 334; 500 MG/5ML; MG/5ML
30 SOLUTION ORAL ONCE
Status: CANCELLED | OUTPATIENT
Start: 2022-09-20 | End: 2022-09-20

## 2022-09-20 RX ADMIN — PROPOFOL 175 MG: 10 INJECTION, EMULSION INTRAVENOUS at 06:09

## 2022-09-20 RX ADMIN — ONDANSETRON 4 MG: 2 INJECTION INTRAMUSCULAR; INTRAVENOUS at 07:09

## 2022-09-20 RX ADMIN — FENTANYL CITRATE 50 MCG: 50 INJECTION, SOLUTION INTRAMUSCULAR; INTRAVENOUS at 06:09

## 2022-09-20 RX ADMIN — SODIUM CHLORIDE, POTASSIUM CHLORIDE, SODIUM LACTATE AND CALCIUM CHLORIDE: 600; 310; 30; 20 INJECTION, SOLUTION INTRAVENOUS at 06:09

## 2022-09-20 RX ADMIN — LIDOCAINE HYDROCHLORIDE 40 MG: 10 INJECTION, SOLUTION EPIDURAL; INFILTRATION; INTRACAUDAL; PERINEURAL at 06:09

## 2022-09-20 RX ADMIN — DEXAMETHASONE SODIUM PHOSPHATE 4 MG: 4 INJECTION, SOLUTION INTRA-ARTICULAR; INTRALESIONAL; INTRAMUSCULAR; INTRAVENOUS; SOFT TISSUE at 07:09

## 2022-09-20 RX ADMIN — FENTANYL CITRATE 50 MCG: 50 INJECTION, SOLUTION INTRAMUSCULAR; INTRAVENOUS at 07:09

## 2022-09-20 RX ADMIN — MIDAZOLAM HYDROCHLORIDE 2 MG: 1 INJECTION, SOLUTION INTRAMUSCULAR; INTRAVENOUS at 06:09

## 2022-09-20 RX ADMIN — MIDAZOLAM HYDROCHLORIDE 2 MG: 1 INJECTION INTRAMUSCULAR; INTRAVENOUS at 06:09

## 2022-09-20 NOTE — OP NOTE
[facility] [address] [place] [address]    PATIENT NAME:      SHELIA REYES  YOB: 1970  CSN:               [id number]  MRN:               [id number]  ADMIT DATE:        09/20/2022 05:20:00  PHYSICIAN:         Song Gomes MD                          OPERATIVE REPORT      DATE OF SURGERY:    09/20/2022 00:00:00    SURGEON:  Song Gomes MD    ASSISTANT SURGEON:  None.    PREOPERATIVE DIAGNOSIS:  Postmenopausal bleeding.    POSTOPERATIVE DIAGNOSIS:  Postmenopausal bleeding.    PROCEDURES:  Hysteroscope, dilatation and curettage, with the MyoSure.    BLOOD LOSS:  5 cc.    INDICATION FOR PROCEDURE:  Patient is a 51-year-old female who presented to my   office with persistent postmenopausal bleeding.  She has not bled in over 5   years, but had been put on hormone replacement therapy by an outside provider.    I had discontinued this therapy, but patient continued to bleed, so it was   decided she needed endometrial sampling to rule out precancerous or cancerous   lesions, and she was agreeable.    FINDINGS:  Small uterine polyps.  Small mobile uterus on exam.    PROCEDURE IN DETAIL:  Patient was taken to the operating room with IV fluids   running.  She was placed in the dorsal lithotomy position after MAC anesthesia   was administered.  She was prepped and draped in the usual sterile fashion.  A   weighted speculum was placed in the posterior aspect of the vagina.  A   single-tooth tenaculum placed on the cervix.  The uterus was sounded to about 9   cm, and then the cervix was dilated up to 5 mm with Hegar dilators.  The   hysteroscope was placed inside the uterus and multiple smooth-walled uterine   polyps were noted.  The Hysteroscope was then removed from the patient's uterus   as to set up for the MyoSure device.  When the camera was reinserted into the   uterus, the  camera was not functioning and was noted to be bent, so we replaced   the camera and visualization was excellent.  The MyoSure device was then placed   inside the uterine cavity and the uterine polyp was removed without any   difficulty.  This was all done with direct visualization.  There was no   bleeding.  Once this was done, all instruments were removed from the patient's   vagina.  All sponge, needle, lap counts were correct x2.  The patient tolerated   the procedure well.      She will be taken to the PACU in stable condition.        ______________________________  MD YUE Alvarez/ALANNAH  DD:  09/20/2022  Time:  07:44AM  DT:  09/20/2022  Time:  08:37AM  Job #:  [id number]      OPERATIVE REPORT

## 2022-09-20 NOTE — ANESTHESIA PREPROCEDURE EVALUATION
"                                                                                                             09/20/2022  Siobhan Ko is a 51 y.o., female.      Pre-op Assessment    I have reviewed the Patient Summary Reports.     I have reviewed the Nursing Notes. I have reviewed the NPO Status.   I have reviewed the Medications.     Review of Systems      Physical Exam  General: Well nourished and Cooperative    Airway:  Mallampati: II   Mouth Opening: Normal  TM Distance: Normal  Tongue: Normal  Neck ROM: Normal ROM    Chest/Lungs:  Clear to auscultation    Heart:  Rate: Normal        Anesthesia Plan  Type of Anesthesia, risks & benefits discussed:    Anesthesia Type: Gen Supraglottic Airway  Intra-op Monitoring Plan: Standard ASA Monitors  Post Op Pain Control Plan: multimodal analgesia  Induction:  IV  Informed Consent: Informed consent signed with the Patient and all parties understand the risks and agree with anesthesia plan.  All questions answered.   ASA Score: 2  Day of Surgery Review of History & Physical: H&P Update referred to the surgeon/provider.    Ready For Surgery From Anesthesia Perspective.     .    I explained anesthesia plan to patient/responsbile party if available.  Anesthesia consent done going over the material facts, risks, complications & alternatives, obtained which includes the possibility of altering the anesthesia plan.  I reviewed problem list, appropriate labs, any workup, Xray, EKG etc noted below.  Patients condition is satisfactory to proceed with anesthesia plan unless otherwise noted (see anesthesia chart for details of the anesthesia plan carried out).      Pre-operative evaluation for Procedure(s) (LRB):  HYSTEROSCOPY, WITH DILATION AND CURETTAGE OF UTERUS (N/A)    /73   Pulse 69   Temp 36.9 °C (98.4 °F) (Oral)   Resp 18   Ht 5' 2" (1.575 m)   Wt 85.7 kg (188 lb 15 oz)   SpO2 97%   Breastfeeding No   BMI 34.56 kg/m²     Patient Active Problem List "   Diagnosis    Postmenopausal bleeding       Review of patient's allergies indicates:  No Known Allergies    Current Outpatient Medications   Medication Instructions    levothyroxine (SYNTHROID) 100 mcg, Oral, Before breakfast    venlafaxine (EFFEXOR-XR) 75 MG 24 hr capsule TAKE 1 CAPSULE BY MOUTH DAILY       Past Surgical History:   Procedure Laterality Date    APPENDECTOMY  1992    FOOT SURGERY Left     from bone spur    FRACTURE SURGERY Left 2020    leg    TUBAL LIGATION  1999       Social History     Socioeconomic History    Marital status:    Tobacco Use    Smoking status: Former     Types: Cigarettes    Smokeless tobacco: Never   Substance and Sexual Activity    Alcohol use: Yes     Alcohol/week: 1.0 standard drink     Types: 1 Cans of beer per week     Comment: weekends    Drug use: Never    Sexual activity: Yes     Partners: Male       Lab Results   Component Value Date    WBC 7.9 09/15/2022    HGB 14.7 09/15/2022    HCT 45.9 09/15/2022    .0 (H) 09/15/2022     09/15/2022          BMP  Lab Results   Component Value Date    HCT 45.9 09/15/2022     05/24/2022    K 4.2 05/24/2022    BUN 8.0 (L) 05/24/2022    CREATININE 0.66 05/24/2022    CALCIUM 9.3 05/24/2022        INR  No results for input(s): PT, INR, PROTIME, APTT in the last 72 hours.        Diagnostic Studies:      EKG:  Results for orders placed or performed in visit on 09/15/22   EKG 12-lead    Collection Time: 08/15/22  2:57 PM    Narrative    Test Reason : Z01.818,    Vent. Rate : 073 BPM     Atrial Rate : 073 BPM     P-R Int : 144 ms          QRS Dur : 086 ms      QT Int : 402 ms       P-R-T Axes : 023 005 025 degrees     QTc Int : 442 ms    Normal sinus rhythm  Normal ECG  No previous ECGs available  Confirmed by Ami Mcmillan MD ([id number]) on 9/15/2022 6:57:33 PM    Referred By: ITZ TERAN           Confirmed By:Ami Mcmillan MD

## 2022-09-20 NOTE — DISCHARGE INSTRUCTIONS
Patient Education       Dilation and Curettage Discharge Instructions   About this topic   The uterus or womb is the organ where a baby grows when you are pregnant. You get rid of the lining of the uterus each time you have your period. Sometimes, the lining needs to be scraped out. Doctors call the procedure a dilation and curettage or a D and C. You may need to have a D and C to:  Remove polyps from the uterus  Remove a birth control device  Remove a part of the placenta after you have a baby if part of the placenta does not come out by itself  Empty the womb after a miscarried pregnancy  Check the lining of the womb for diseases or infection  Understand why you are having heavy periods or bleeding that is not normal. You may be having pain in your pelvis or your uterus may be larger than a normal uterus.  Removal of molar pregnancy  Induced   Dilation means the opening to the womb or cervix is stretched. Curettage is the scraping of the lining.     What care is needed at home?   It is normal to have vaginal bleeding for a few weeks. You may use sanitary pads, but not tampons.  Do not douche, use tampons, or have sexual contact for 2 weeks or until release by your doctor.  You may shower in 24 hours.  No tub baths, swimming, or use a hot tub until release by your doctor..  Ask your doctor about when it is safe for you to go back to your normal activities like work, driving, and sex.  Y ou may have some cramping for a couple of weeks. You may take Ibuprofen or Tylenol.  What follow-up care is needed?   Be sure to keep your follow-up visit.  Will physical activity be limited?   Rest for the first few days after the procedure. Avoid strenuous activities like heavy lifting and hard workouts.  What problems could happen?   Harm to the cervix  Scarring of the lining of the uterus  Infection  Bleeding  A hole in the uterus from the tools used during the procedure  When do I need to call the doctor?   Signs of  infection like a fever of 100.4°F (38°C) or higher, chills, pain with passing urine, or bad smelling drainage from the vagina.  Very bad belly pain  Heavy bleeding  Upset stomach and throwing up  You are not feeling better in 2 to 3 days or you are feeling worse

## 2022-09-20 NOTE — ANESTHESIA POSTPROCEDURE EVALUATION
Anesthesia Post Evaluation    Patient: Siobhan Ko    Procedure(s) Performed: Procedure(s) (LRB):  HYSTEROSCOPY, WITH DILATION AND CURETTAGE OF UTERUS (N/A)    Final Anesthesia Type: general      Patient location during evaluation: PACU  Patient participation: No - Unable to Participate, Sedation  Level of consciousness: sedated  Post-procedure vital signs: reviewed and stable  Pain management: adequate  Airway patency: patent  NATHANAEL mitigation strategies: Multimodal analgesia  PONV status at discharge: No PONV  Anesthetic complications: no      Cardiovascular status: stable  Respiratory status: nasal cannula  Hydration status: euvolemic  Follow-up not needed.          Vitals Value Taken Time   /73 09/20/22 0536   Temp 36.9 °C (98.4 °F) 09/20/22 0536   Pulse 69 09/20/22 0536   Resp 18 09/20/22 0553   SpO2 97 % 09/20/22 0536         No case tracking events are documented in the log.      Pain/Alannah Score: No data recorded

## 2022-09-20 NOTE — PLAN OF CARE
VSS, zeinab score  10, pt arousable and ready for transfer to [facility] per Dr Menard.

## 2022-09-21 VITALS
RESPIRATION RATE: 18 BRPM | SYSTOLIC BLOOD PRESSURE: 137 MMHG | BODY MASS INDEX: 34.77 KG/M2 | OXYGEN SATURATION: 93 % | HEART RATE: 64 BPM | WEIGHT: 188.94 LBS | TEMPERATURE: 96 F | HEIGHT: 62 IN | DIASTOLIC BLOOD PRESSURE: 79 MMHG

## 2022-09-21 LAB
ESTROGEN SERPL-MCNC: NORMAL PG/ML
INSULIN SERPL-ACNC: NORMAL U[IU]/ML
LAB AP CLINICAL INFORMATION: NORMAL
LAB AP GROSS DESCRIPTION: NORMAL
LAB AP REPORT FOOTNOTES: NORMAL
T3RU NFR SERPL: NORMAL %

## 2022-10-10 ENCOUNTER — OFFICE VISIT (OUTPATIENT)
Dept: FAMILY MEDICINE | Facility: CLINIC | Age: 52
End: 2022-10-10
Payer: COMMERCIAL

## 2022-10-10 VITALS
BODY MASS INDEX: 34.82 KG/M2 | WEIGHT: 189.19 LBS | HEIGHT: 62 IN | TEMPERATURE: 97 F | HEART RATE: 68 BPM | OXYGEN SATURATION: 97 % | SYSTOLIC BLOOD PRESSURE: 128 MMHG | RESPIRATION RATE: 18 BRPM | DIASTOLIC BLOOD PRESSURE: 70 MMHG

## 2022-10-10 DIAGNOSIS — F41.9 ANXIETY: Primary | ICD-10-CM

## 2022-10-10 DIAGNOSIS — E03.9 HYPOTHYROIDISM, UNSPECIFIED TYPE: ICD-10-CM

## 2022-10-10 PROBLEM — E66.9 OBESITY: Status: ACTIVE | Noted: 2022-10-10

## 2022-10-10 PROBLEM — G47.33 OBSTRUCTIVE SLEEP APNEA SYNDROME: Status: ACTIVE | Noted: 2022-10-10

## 2022-10-10 PROCEDURE — 3078F DIAST BP <80 MM HG: CPT | Mod: CPTII,,, | Performed by: FAMILY MEDICINE

## 2022-10-10 PROCEDURE — 3074F PR MOST RECENT SYSTOLIC BLOOD PRESSURE < 130 MM HG: ICD-10-PCS | Mod: CPTII,,, | Performed by: FAMILY MEDICINE

## 2022-10-10 PROCEDURE — 3008F PR BODY MASS INDEX (BMI) DOCUMENTED: ICD-10-PCS | Mod: CPTII,,, | Performed by: FAMILY MEDICINE

## 2022-10-10 PROCEDURE — 99214 OFFICE O/P EST MOD 30 MIN: CPT | Mod: ,,, | Performed by: FAMILY MEDICINE

## 2022-10-10 PROCEDURE — 3074F SYST BP LT 130 MM HG: CPT | Mod: CPTII,,, | Performed by: FAMILY MEDICINE

## 2022-10-10 PROCEDURE — 1159F MED LIST DOCD IN RCRD: CPT | Mod: CPTII,,, | Performed by: FAMILY MEDICINE

## 2022-10-10 PROCEDURE — 1160F PR REVIEW ALL MEDS BY PRESCRIBER/CLIN PHARMACIST DOCUMENTED: ICD-10-PCS | Mod: CPTII,,, | Performed by: FAMILY MEDICINE

## 2022-10-10 PROCEDURE — 1160F RVW MEDS BY RX/DR IN RCRD: CPT | Mod: CPTII,,, | Performed by: FAMILY MEDICINE

## 2022-10-10 PROCEDURE — 99214 PR OFFICE/OUTPT VISIT, EST, LEVL IV, 30-39 MIN: ICD-10-PCS | Mod: ,,, | Performed by: FAMILY MEDICINE

## 2022-10-10 PROCEDURE — 3008F BODY MASS INDEX DOCD: CPT | Mod: CPTII,,, | Performed by: FAMILY MEDICINE

## 2022-10-10 PROCEDURE — 1159F PR MEDICATION LIST DOCUMENTED IN MEDICAL RECORD: ICD-10-PCS | Mod: CPTII,,, | Performed by: FAMILY MEDICINE

## 2022-10-10 PROCEDURE — 3078F PR MOST RECENT DIASTOLIC BLOOD PRESSURE < 80 MM HG: ICD-10-PCS | Mod: CPTII,,, | Performed by: FAMILY MEDICINE

## 2022-10-10 RX ORDER — VENLAFAXINE HYDROCHLORIDE 75 MG/1
75 CAPSULE, EXTENDED RELEASE ORAL DAILY
Qty: 30 CAPSULE | Refills: 6 | Status: SHIPPED | OUTPATIENT
Start: 2022-10-10 | End: 2023-05-23

## 2022-10-10 RX ORDER — PROGESTERONE 200 MG/1
200 CAPSULE ORAL NIGHTLY
Status: ON HOLD | COMMUNITY
Start: 2022-05-31 | End: 2023-11-15 | Stop reason: HOSPADM

## 2022-10-10 RX ORDER — LIOTHYRONINE SODIUM 5 UG/1
5 TABLET ORAL DAILY
COMMUNITY
Start: 2022-05-31

## 2022-10-10 RX ORDER — ESTRADIOL 1 MG/1
1 TABLET ORAL NIGHTLY
Status: ON HOLD | COMMUNITY
Start: 2022-05-31 | End: 2023-11-15 | Stop reason: HOSPADM

## 2022-10-10 RX ORDER — PREDNISONE 10 MG/1
TABLET ORAL
Status: ON HOLD | COMMUNITY
Start: 2022-07-13 | End: 2023-11-15 | Stop reason: HOSPADM

## 2022-10-10 RX ORDER — BUPROPION HYDROCHLORIDE 150 MG/1
TABLET ORAL
COMMUNITY
End: 2022-10-10

## 2022-10-10 NOTE — PROGRESS NOTES
"Subjective:       Patient ID: Siobhan Ko is a 52 y.o. female.    Chief Complaint: Medication Management      Routine      Review of Systems   Constitutional: Negative.    Respiratory: Negative.     Cardiovascular: Negative.    Endocrine: Negative for cold intolerance.        Hypothyroid: tolerating medication, medication working well, patient without any complaints     Genitourinary:         Recent D&C with Dr Lewis   Psychiatric/Behavioral:          Anxiety: tolerating medication, medication working well, patient without any complaints           Objective:      /70 (BP Location: Left arm, Patient Position: Sitting, BP Method: Large (Manual))   Pulse 68   Temp 97.1 °F (36.2 °C)   Resp 18   Ht 5' 2" (1.575 m)   Wt 85.8 kg (189 lb 3.2 oz)   SpO2 97%   BMI 34.61 kg/m²    Physical Exam  Constitutional:       Appearance: Normal appearance.   Cardiovascular:      Rate and Rhythm: Normal rate and regular rhythm.      Heart sounds: Normal heart sounds.   Pulmonary:      Effort: Pulmonary effort is normal.      Breath sounds: Normal breath sounds.   Neurological:      Mental Status: She is alert.   Psychiatric:         Mood and Affect: Mood normal.         Behavior: Behavior normal.         Thought Content: Thought content normal.         Judgment: Judgment normal.           Assessment:       Problem List Items Addressed This Visit          Psychiatric    Anxiety - Primary    Relevant Medications    venlafaxine (EFFEXOR-XR) 75 MG 24 hr capsule       Endocrine    Hypothyroidism          Plan:   1. Anxiety  -     venlafaxine (EFFEXOR-XR) 75 MG 24 hr capsule; Take 1 capsule (75 mg total) by mouth once daily.  Dispense: 30 capsule; Refill: 6  Controlled  Continue current Rx medication  Return to clinic with any concerns    2. Hypothyroidism, unspecified type  Continue current Rx medication  Keep scheduled appointment with Endocrinology  Return to clinic with any concerns        "

## 2022-12-08 ENCOUNTER — DOCUMENTATION ONLY (OUTPATIENT)
Dept: ADMINISTRATIVE | Facility: HOSPITAL | Age: 52
End: 2022-12-08
Payer: COMMERCIAL

## 2022-12-14 ENCOUNTER — PATIENT OUTREACH (OUTPATIENT)
Dept: ADMINISTRATIVE | Facility: HOSPITAL | Age: 52
End: 2022-12-14
Payer: COMMERCIAL

## 2022-12-26 ENCOUNTER — OFFICE VISIT (OUTPATIENT)
Dept: URGENT CARE | Facility: CLINIC | Age: 52
End: 2022-12-26
Payer: COMMERCIAL

## 2022-12-26 VITALS
BODY MASS INDEX: 33.13 KG/M2 | SYSTOLIC BLOOD PRESSURE: 104 MMHG | TEMPERATURE: 99 F | RESPIRATION RATE: 20 BRPM | WEIGHT: 180 LBS | HEIGHT: 62 IN | DIASTOLIC BLOOD PRESSURE: 71 MMHG | HEART RATE: 75 BPM | OXYGEN SATURATION: 96 %

## 2022-12-26 DIAGNOSIS — J32.9 SINUSITIS, UNSPECIFIED CHRONICITY, UNSPECIFIED LOCATION: Primary | ICD-10-CM

## 2022-12-26 DIAGNOSIS — J34.89 SINUS PRESSURE: ICD-10-CM

## 2022-12-26 PROCEDURE — 1160F RVW MEDS BY RX/DR IN RCRD: CPT | Mod: CPTII,,,

## 2022-12-26 PROCEDURE — 3008F BODY MASS INDEX DOCD: CPT | Mod: CPTII,,,

## 2022-12-26 PROCEDURE — 3008F PR BODY MASS INDEX (BMI) DOCUMENTED: ICD-10-PCS | Mod: CPTII,,,

## 2022-12-26 PROCEDURE — 3074F PR MOST RECENT SYSTOLIC BLOOD PRESSURE < 130 MM HG: ICD-10-PCS | Mod: CPTII,,,

## 2022-12-26 PROCEDURE — 96372 THER/PROPH/DIAG INJ SC/IM: CPT | Mod: ,,,

## 2022-12-26 PROCEDURE — 1159F MED LIST DOCD IN RCRD: CPT | Mod: CPTII,,,

## 2022-12-26 PROCEDURE — 1160F PR REVIEW ALL MEDS BY PRESCRIBER/CLIN PHARMACIST DOCUMENTED: ICD-10-PCS | Mod: CPTII,,,

## 2022-12-26 PROCEDURE — 3078F DIAST BP <80 MM HG: CPT | Mod: CPTII,,,

## 2022-12-26 PROCEDURE — 3078F PR MOST RECENT DIASTOLIC BLOOD PRESSURE < 80 MM HG: ICD-10-PCS | Mod: CPTII,,,

## 2022-12-26 PROCEDURE — 99213 PR OFFICE/OUTPT VISIT, EST, LEVL III, 20-29 MIN: ICD-10-PCS | Mod: 25,,,

## 2022-12-26 PROCEDURE — 1159F PR MEDICATION LIST DOCUMENTED IN MEDICAL RECORD: ICD-10-PCS | Mod: CPTII,,,

## 2022-12-26 PROCEDURE — 99213 OFFICE O/P EST LOW 20 MIN: CPT | Mod: 25,,,

## 2022-12-26 PROCEDURE — 96372 PR INJECTION,THERAP/PROPH/DIAG2ST, IM OR SUBCUT: ICD-10-PCS | Mod: ,,,

## 2022-12-26 PROCEDURE — 3074F SYST BP LT 130 MM HG: CPT | Mod: CPTII,,,

## 2022-12-26 RX ORDER — BETAMETHASONE SODIUM PHOSPHATE AND BETAMETHASONE ACETATE 3; 3 MG/ML; MG/ML
9 INJECTION, SUSPENSION INTRA-ARTICULAR; INTRALESIONAL; INTRAMUSCULAR; SOFT TISSUE
Status: COMPLETED | OUTPATIENT
Start: 2022-12-26 | End: 2022-12-26

## 2022-12-26 RX ORDER — LEVOCETIRIZINE DIHYDROCHLORIDE 5 MG/1
5 TABLET, FILM COATED ORAL NIGHTLY
Qty: 30 TABLET | Refills: 0 | Status: SHIPPED | OUTPATIENT
Start: 2022-12-26 | End: 2023-04-10

## 2022-12-26 RX ORDER — AMOXICILLIN AND CLAVULANATE POTASSIUM 875; 125 MG/1; MG/1
1 TABLET, FILM COATED ORAL EVERY 12 HOURS
Qty: 14 TABLET | Refills: 0 | Status: SHIPPED | OUTPATIENT
Start: 2022-12-26 | End: 2023-01-02

## 2022-12-26 RX ADMIN — BETAMETHASONE SODIUM PHOSPHATE AND BETAMETHASONE ACETATE 9 MG: 3; 3 INJECTION, SUSPENSION INTRA-ARTICULAR; INTRALESIONAL; INTRAMUSCULAR; SOFT TISSUE at 11:12

## 2022-12-26 NOTE — PROGRESS NOTES
"Subjective:       Patient ID: Siobhan Ko is a 52 y.o. female.    Vitals:  height is 5' 2" (1.575 m) and weight is 81.6 kg (180 lb).     Chief Complaint: Nasal Congestion (Nasal congestion x 1 week )    HPI  ROS    Objective:      Physical Exam      Assessment:       No diagnosis found.      Plan:         There are no diagnoses linked to this encounter.                 "

## 2022-12-26 NOTE — PROGRESS NOTES
"Subjective:       Patient ID: Siobhan Ko is a 52 y.o. female.    Vitals:  height is 5' 2" (1.575 m) and weight is 81.6 kg (180 lb). Her oral temperature is 98.5 °F (36.9 °C). Her blood pressure is 104/71 and her pulse is 75. Her respiration is 20 and oxygen saturation is 96%.     Chief Complaint: Nasal Congestion (Nasal congestion x 1 week )    Patient is a 52-year-old female presenting with a one-week history of congestion and sinus pressure.  States she has taken several over-the-counter medications and is not getting better.  Patient denies any SOB, CP, rash, n/v/d, or neck stiffness.      Patient refuses any COVID, flu, strep test.  States she thinks it is just time for a steroid shot antibiotics as the head/sinus pressure is so bad from buildup of fluid.      HENT:  Positive for congestion and sinus pressure.      Objective:      Physical Exam   Constitutional: She is oriented to person, place, and time.  Non-toxic appearance. She does not appear ill.   HENT:   Ears:   Right Ear: Tympanic membrane, external ear and ear canal normal.   Left Ear: Tympanic membrane, external ear and ear canal normal.   Nose: Rhinorrhea present. Right sinus exhibits maxillary sinus tenderness. Left sinus exhibits maxillary sinus tenderness.   Mouth/Throat: Posterior oropharyngeal erythema present. No tonsillar exudate.   Clear postnasal drip noted      Comments: Clear postnasal drip noted  Pulmonary/Chest: Effort normal and breath sounds normal.   Abdominal: Normal appearance and bowel sounds are normal. Soft. There is no abdominal tenderness.   Musculoskeletal: Normal range of motion.         General: Normal range of motion.   Neurological: She is alert and oriented to person, place, and time.   Skin: Skin is warm and dry. Capillary refill takes less than 2 seconds.   Psychiatric: Her behavior is normal. Mood normal.       Assessment:       1. Sinusitis, unspecified chronicity, unspecified location    2. Sinus pressure  "         Plan:         Sinusitis, unspecified chronicity, unspecified location  -     betamethasone acetate-betamethasone sodium phosphate injection 9 mg  -     levocetirizine (XYZAL) 5 MG tablet; Take 1 tablet (5 mg total) by mouth every evening.  Dispense: 30 tablet; Refill: 0  -     amoxicillin-clavulanate 875-125mg (AUGMENTIN) 875-125 mg per tablet; Take 1 tablet by mouth every 12 (twelve) hours. for 7 days  Dispense: 14 tablet; Refill: 0    Sinus pressure            Take OTC cough/cold/congestion medication such as Dayquil/Nyquil.  May also take antihistamine such as Claritin/Zyrtec/Allegra.  Cepacol sore throat lozenges if needed.     Drink plenty of fluids.  Rest.      Take antibiotic until completely gone. Take with food to avoid upset stomach.

## 2022-12-26 NOTE — PATIENT INSTRUCTIONS
Take OTC cough/cold/congestion medication such as Dayquil/Nyquil.  May also take antihistamine such as Claritin/Zyrtec/Allegra.  Cepacol sore throat lozenges if needed.     Drink plenty of fluids.  Rest.      Take antibiotic until completely gone. Take with food to avoid upset stomach.

## 2023-03-14 ENCOUNTER — LAB VISIT (OUTPATIENT)
Dept: LAB | Facility: HOSPITAL | Age: 53
End: 2023-03-14
Attending: NURSE PRACTITIONER
Payer: COMMERCIAL

## 2023-03-14 DIAGNOSIS — I51.9 MYXEDEMA HEART DISEASE: ICD-10-CM

## 2023-03-14 DIAGNOSIS — E55.9 AVITAMINOSIS D: ICD-10-CM

## 2023-03-14 DIAGNOSIS — R73.9 BLOOD GLUCOSE ELEVATED: ICD-10-CM

## 2023-03-14 DIAGNOSIS — Z00.00 ROUTINE GENERAL MEDICAL EXAMINATION AT A HEALTH CARE FACILITY: ICD-10-CM

## 2023-03-14 DIAGNOSIS — Z79.899 ENCOUNTER FOR LONG-TERM (CURRENT) USE OF OTHER MEDICATIONS: ICD-10-CM

## 2023-03-14 DIAGNOSIS — N95.1 SYMPTOMATIC MENOPAUSAL OR FEMALE CLIMACTERIC STATES: ICD-10-CM

## 2023-03-14 DIAGNOSIS — E53.9 VITAMIN B-COMPLEX DEFICIENCY: ICD-10-CM

## 2023-03-14 DIAGNOSIS — E03.9 MYXEDEMA HEART DISEASE: ICD-10-CM

## 2023-03-14 DIAGNOSIS — D50.9 IRON DEFICIENCY ANEMIA, UNSPECIFIED: ICD-10-CM

## 2023-03-14 DIAGNOSIS — E34.9 ENDOCRINE DISORDER RELATED TO PUBERTY: Primary | ICD-10-CM

## 2023-03-14 LAB
ALBUMIN SERPL-MCNC: 3.9 G/DL (ref 3.5–5)
ALBUMIN/GLOB SERPL: 1.3 RATIO (ref 1.1–2)
ALP SERPL-CCNC: 79 UNIT/L (ref 40–150)
ALT SERPL-CCNC: 25 UNIT/L (ref 0–55)
AST SERPL-CCNC: 19 UNIT/L (ref 5–34)
BASOPHILS # BLD AUTO: 0.02 X10(3)/MCL (ref 0–0.2)
BASOPHILS NFR BLD AUTO: 0.3 %
BILIRUBIN DIRECT+TOT PNL SERPL-MCNC: 0.3 MG/DL
BUN SERPL-MCNC: 9 MG/DL (ref 9.8–20.1)
CALCIUM SERPL-MCNC: 9.5 MG/DL (ref 8.4–10.2)
CHLORIDE SERPL-SCNC: 106 MMOL/L (ref 98–107)
CHOLEST SERPL-MCNC: 173 MG/DL
CHOLEST/HDLC SERPL: 5 {RATIO} (ref 0–5)
CO2 SERPL-SCNC: 29 MMOL/L (ref 22–29)
CREAT SERPL-MCNC: 0.61 MG/DL (ref 0.55–1.02)
DEPRECATED CALCIDIOL+CALCIFEROL SERPL-MC: 35.4 NG/ML (ref 30–80)
EOSINOPHIL # BLD AUTO: 0.06 X10(3)/MCL (ref 0–0.9)
EOSINOPHIL NFR BLD AUTO: 1 %
ERYTHROCYTE [DISTWIDTH] IN BLOOD BY AUTOMATED COUNT: 13.2 % (ref 11.5–17)
EST. AVERAGE GLUCOSE BLD GHB EST-MCNC: 96.8 MG/DL
ESTRADIOL SERPL HS-MCNC: <24 PG/ML
FERRITIN SERPL-MCNC: 240.96 NG/ML (ref 4.63–204)
FSH SERPL-ACNC: 69.07 MIU/ML
GFR SERPLBLD CREATININE-BSD FMLA CKD-EPI: >60 MLS/MIN/1.73/M2
GLOBULIN SER-MCNC: 3 GM/DL (ref 2.4–3.5)
GLUCOSE SERPL-MCNC: 97 MG/DL (ref 74–100)
HBA1C MFR BLD: 5 %
HCT VFR BLD AUTO: 43.7 % (ref 37–47)
HDLC SERPL-MCNC: 38 MG/DL (ref 35–60)
HGB BLD-MCNC: 14.3 G/DL (ref 12–16)
IMM GRANULOCYTES # BLD AUTO: 0.01 X10(3)/MCL (ref 0–0.04)
IMM GRANULOCYTES NFR BLD AUTO: 0.2 %
INSULIN SERPL-MCNC: 7 UU/ML
IRON SATN MFR SERPL: 30 % (ref 20–50)
IRON SERPL-MCNC: 71 UG/DL (ref 50–170)
LDLC SERPL CALC-MCNC: 123 MG/DL (ref 50–140)
LYMPHOCYTES # BLD AUTO: 2.06 X10(3)/MCL (ref 0.6–4.6)
LYMPHOCYTES NFR BLD AUTO: 35.8 %
MCH RBC QN AUTO: 31.2 PG
MCHC RBC AUTO-ENTMCNC: 32.7 G/DL (ref 33–36)
MCV RBC AUTO: 95.4 FL (ref 80–94)
MONOCYTES # BLD AUTO: 0.44 X10(3)/MCL (ref 0.1–1.3)
MONOCYTES NFR BLD AUTO: 7.6 %
NEUTROPHILS # BLD AUTO: 3.17 X10(3)/MCL (ref 2.1–9.2)
NEUTROPHILS NFR BLD AUTO: 55.1 %
NRBC BLD AUTO-RTO: 0 %
PLATELET # BLD AUTO: 272 X10(3)/MCL (ref 130–400)
PMV BLD AUTO: 9.6 FL (ref 7.4–10.4)
POTASSIUM SERPL-SCNC: 4.3 MMOL/L (ref 3.5–5.1)
PROLACTIN LEVEL (OHS): 6.59 NG/ML (ref 5.18–26.53)
PROT SERPL-MCNC: 6.9 GM/DL (ref 6.4–8.3)
RBC # BLD AUTO: 4.58 X10(6)/MCL (ref 4.2–5.4)
SODIUM SERPL-SCNC: 142 MMOL/L (ref 136–145)
T3FREE SERPL-MCNC: 3.38 PG/ML (ref 1.57–3.91)
T4 FREE SERPL-MCNC: 1.27 NG/DL (ref 0.7–1.48)
TESTOST SERPL-MCNC: 496.01 NG/DL (ref 12.4–35.75)
TIBC SERPL-MCNC: 167 UG/DL (ref 70–310)
TIBC SERPL-MCNC: 238 UG/DL (ref 250–450)
TRANSFERRIN SERPL-MCNC: 195 MG/DL (ref 180–382)
TRIGL SERPL-MCNC: 59 MG/DL (ref 37–140)
TSH SERPL-ACNC: 0.01 UIU/ML (ref 0.35–4.94)
URATE SERPL-MCNC: 4.9 MG/DL (ref 2.6–6)
VIT B12 SERPL-MCNC: 1690 PG/ML (ref 213–816)
VLDLC SERPL CALC-MCNC: 12 MG/DL
WBC # SPEC AUTO: 5.8 X10(3)/MCL (ref 4.5–11.5)

## 2023-03-14 PROCEDURE — 84146 ASSAY OF PROLACTIN: CPT

## 2023-03-14 PROCEDURE — 36415 COLL VENOUS BLD VENIPUNCTURE: CPT

## 2023-03-14 PROCEDURE — 84443 ASSAY THYROID STIM HORMONE: CPT

## 2023-03-14 PROCEDURE — 83036 HEMOGLOBIN GLYCOSYLATED A1C: CPT

## 2023-03-14 PROCEDURE — 83525 ASSAY OF INSULIN: CPT

## 2023-03-14 PROCEDURE — 85025 COMPLETE CBC W/AUTO DIFF WBC: CPT

## 2023-03-14 PROCEDURE — 82670 ASSAY OF TOTAL ESTRADIOL: CPT

## 2023-03-14 PROCEDURE — 80053 COMPREHEN METABOLIC PANEL: CPT

## 2023-03-14 PROCEDURE — 83550 IRON BINDING TEST: CPT

## 2023-03-14 PROCEDURE — 82607 VITAMIN B-12: CPT

## 2023-03-14 PROCEDURE — 86376 MICROSOMAL ANTIBODY EACH: CPT

## 2023-03-14 PROCEDURE — 82306 VITAMIN D 25 HYDROXY: CPT

## 2023-03-14 PROCEDURE — 82728 ASSAY OF FERRITIN: CPT

## 2023-03-14 PROCEDURE — 83001 ASSAY OF GONADOTROPIN (FSH): CPT

## 2023-03-14 PROCEDURE — 84403 ASSAY OF TOTAL TESTOSTERONE: CPT

## 2023-03-14 PROCEDURE — 84550 ASSAY OF BLOOD/URIC ACID: CPT

## 2023-03-14 PROCEDURE — 84481 FREE ASSAY (FT-3): CPT

## 2023-03-14 PROCEDURE — 84439 ASSAY OF FREE THYROXINE: CPT

## 2023-03-14 PROCEDURE — 80061 LIPID PANEL: CPT

## 2023-03-16 LAB
THYROID PEROXIDASE (OLG): NEGATIVE
THYROID PEROXIDASE QUANT (OLG): <4 IU/ML

## 2023-04-10 ENCOUNTER — OFFICE VISIT (OUTPATIENT)
Dept: FAMILY MEDICINE | Facility: CLINIC | Age: 53
End: 2023-04-10
Payer: COMMERCIAL

## 2023-04-10 VITALS
TEMPERATURE: 97 F | RESPIRATION RATE: 18 BRPM | HEIGHT: 62 IN | WEIGHT: 178 LBS | DIASTOLIC BLOOD PRESSURE: 66 MMHG | SYSTOLIC BLOOD PRESSURE: 114 MMHG | BODY MASS INDEX: 32.76 KG/M2 | HEART RATE: 71 BPM | OXYGEN SATURATION: 97 %

## 2023-04-10 DIAGNOSIS — E66.01 MORBID (SEVERE) OBESITY DUE TO EXCESS CALORIES: ICD-10-CM

## 2023-04-10 DIAGNOSIS — Z00.00 WELLNESS EXAMINATION: Primary | ICD-10-CM

## 2023-04-10 DIAGNOSIS — F41.9 ANXIETY: ICD-10-CM

## 2023-04-10 DIAGNOSIS — E03.9 HYPOTHYROIDISM, UNSPECIFIED TYPE: ICD-10-CM

## 2023-04-10 PROCEDURE — 3074F SYST BP LT 130 MM HG: CPT | Mod: CPTII,,, | Performed by: FAMILY MEDICINE

## 2023-04-10 PROCEDURE — 1160F PR REVIEW ALL MEDS BY PRESCRIBER/CLIN PHARMACIST DOCUMENTED: ICD-10-PCS | Mod: CPTII,,, | Performed by: FAMILY MEDICINE

## 2023-04-10 PROCEDURE — 99396 PR PREVENTIVE VISIT,EST,40-64: ICD-10-PCS | Mod: ,,, | Performed by: FAMILY MEDICINE

## 2023-04-10 PROCEDURE — 3008F BODY MASS INDEX DOCD: CPT | Mod: CPTII,,, | Performed by: FAMILY MEDICINE

## 2023-04-10 PROCEDURE — 99396 PREV VISIT EST AGE 40-64: CPT | Mod: ,,, | Performed by: FAMILY MEDICINE

## 2023-04-10 PROCEDURE — 3008F PR BODY MASS INDEX (BMI) DOCUMENTED: ICD-10-PCS | Mod: CPTII,,, | Performed by: FAMILY MEDICINE

## 2023-04-10 PROCEDURE — 1159F MED LIST DOCD IN RCRD: CPT | Mod: CPTII,,, | Performed by: FAMILY MEDICINE

## 2023-04-10 PROCEDURE — 1160F RVW MEDS BY RX/DR IN RCRD: CPT | Mod: CPTII,,, | Performed by: FAMILY MEDICINE

## 2023-04-10 PROCEDURE — 1159F PR MEDICATION LIST DOCUMENTED IN MEDICAL RECORD: ICD-10-PCS | Mod: CPTII,,, | Performed by: FAMILY MEDICINE

## 2023-04-10 PROCEDURE — 3078F PR MOST RECENT DIASTOLIC BLOOD PRESSURE < 80 MM HG: ICD-10-PCS | Mod: CPTII,,, | Performed by: FAMILY MEDICINE

## 2023-04-10 PROCEDURE — 3078F DIAST BP <80 MM HG: CPT | Mod: CPTII,,, | Performed by: FAMILY MEDICINE

## 2023-04-10 PROCEDURE — 3074F PR MOST RECENT SYSTOLIC BLOOD PRESSURE < 130 MM HG: ICD-10-PCS | Mod: CPTII,,, | Performed by: FAMILY MEDICINE

## 2023-04-10 NOTE — PROGRESS NOTES
"Subjective:       Patient ID: Siobhan Ko is a 52 y.o. female.    Chief Complaint: 6 month follow up and c/o feeling overwhelmed at times       Routine      Review of Systems   Constitutional: Negative.    Respiratory: Negative.     Cardiovascular: Negative.    Gastrointestinal: Negative.         Prior colonoscopy with Dr Justice   Endocrine: Negative for cold intolerance.        Hypothyroid: tolerating medication, medication working well, patient without any complaints, followed by NP in [place]     Genitourinary:         Annual sean with Dr Gomes   Psychiatric/Behavioral: Negative.          Anxiety: reports occ feelings of being overwhelmed, tolerating medication         Objective:      /66 (BP Location: Left arm, Patient Position: Sitting, BP Method: Large (Manual))   Pulse 71   Temp 97.1 °F (36.2 °C)   Resp 18   Ht 5' 2" (1.575 m)   Wt 80.7 kg (178 lb)   SpO2 97%   BMI 32.56 kg/m²    Physical Exam  Constitutional:       Appearance: Normal appearance.   Cardiovascular:      Rate and Rhythm: Normal rate and regular rhythm.      Heart sounds: Normal heart sounds.   Pulmonary:      Effort: Pulmonary effort is normal.      Breath sounds: Normal breath sounds.   Neurological:      Mental Status: She is alert.   Psychiatric:         Mood and Affect: Mood normal.         Behavior: Behavior normal.         Thought Content: Thought content normal.         Judgment: Judgment normal.           Recent Results (from the past 2688 hour(s))   Estradiol    Collection Time: 03/14/23  7:21 AM   Result Value Ref Range    Estradiol Level <24 pg/mL   Testosterone    Collection Time: 03/14/23  7:21 AM   Result Value Ref Range    Testosterone Total 496.01 (H) 12.40 - 35.75 ng/dL   Follicle Stimulating Hormone    Collection Time: 03/14/23  7:21 AM   Result Value Ref Range    Follicle Stimulating Hormone 69.07 mIU/mL   TSH    Collection Time: 03/14/23  7:21 AM   Result Value Ref Range    Thyroid Stimulating " Hormone 0.009 (L) 0.350 - 4.940 uIU/mL   T4, Free    Collection Time: 03/14/23  7:21 AM   Result Value Ref Range    Thyroxine Free 1.27 0.70 - 1.48 ng/dL   T3, Free (OLG)    Collection Time: 03/14/23  7:21 AM   Result Value Ref Range    T3 Free 3.38 1.57 - 3.91 pg/mL   THYROID PEROXIDASE (TPO)    Collection Time: 03/14/23  7:21 AM   Result Value Ref Range    Thyroid Peroxidase Negative Negative    TPO Ab Quant <4 <=25 IU/mL   Comprehensive Metabolic Panel    Collection Time: 03/14/23  7:21 AM   Result Value Ref Range    Sodium Level 142 136 - 145 mmol/L    Potassium Level 4.3 3.5 - 5.1 mmol/L    Chloride 106 98 - 107 mmol/L    Carbon Dioxide 29 22 - 29 mmol/L    Glucose Level 97 74 - 100 mg/dL    Blood Urea Nitrogen 9.0 (L) 9.8 - 20.1 mg/dL    Creatinine 0.61 0.55 - 1.02 mg/dL    Calcium Level Total 9.5 8.4 - 10.2 mg/dL    Protein Total 6.9 6.4 - 8.3 gm/dL    Albumin Level 3.9 3.5 - 5.0 g/dL    Globulin 3.0 2.4 - 3.5 gm/dL    Albumin/Globulin Ratio 1.3 1.1 - 2.0 ratio    Bilirubin Total 0.3 <=1.5 mg/dL    Alkaline Phosphatase 79 40 - 150 unit/L    Alanine Aminotransferase 25 0 - 55 unit/L    Aspartate Aminotransferase 19 5 - 34 unit/L    eGFR >60 mls/min/1.73/m2   Vitamin D    Collection Time: 03/14/23  7:21 AM   Result Value Ref Range    Vit D 25 OH 35.4 30.0 - 80.0 ng/mL   Vitamin B12    Collection Time: 03/14/23  7:21 AM   Result Value Ref Range    Vitamin B12 Level 1,690 (H) 213 - 816 pg/mL   Lipid Panel    Collection Time: 03/14/23  7:21 AM   Result Value Ref Range    Cholesterol Total 173 <=200 mg/dL    HDL Cholesterol 38 35 - 60 mg/dL    Triglyceride 59 37 - 140 mg/dL    Cholesterol/HDL Ratio 5 0 - 5    Very Low Density Lipoprotein 12     LDL Cholesterol 123.00 50.00 - 140.00 mg/dL   Insulin, S    Collection Time: 03/14/23  7:21 AM   Result Value Ref Range    Insulin Level Total 7.0 <=25.0 uU/mL   Hemoglobin A1C    Collection Time: 03/14/23  7:21 AM   Result Value Ref Range    Hemoglobin A1c 5.0 <=7.0 %     Estimated Average Glucose 96.8 mg/dL   Ferritin    Collection Time: 03/14/23  7:21 AM   Result Value Ref Range    Ferritin Level 240.96 (H) 4.63 - 204.00 ng/mL   Iron and TIBC    Collection Time: 03/14/23  7:21 AM   Result Value Ref Range    Iron Binding Capacity Unsaturated 167 70 - 310 ug/dL    Iron Level 71 50 - 170 ug/dL    Transferrin 195 180 - 382 mg/dL    Iron Binding Capacity Total 238 (L) 250 - 450 ug/dL    Iron Saturation 30 20 - 50 %   Prolactin    Collection Time: 03/14/23  7:21 AM   Result Value Ref Range    Prolactin Level 6.59 5.18 - 26.53 ng/mL   Uric Acid    Collection Time: 03/14/23  7:21 AM   Result Value Ref Range    Uric Acid 4.9 2.6 - 6.0 mg/dL   CBC with Differential    Collection Time: 03/14/23  7:21 AM   Result Value Ref Range    WBC 5.8 4.5 - 11.5 x10(3)/mcL    RBC 4.58 4.20 - 5.40 x10(6)/mcL    Hgb 14.3 12.0 - 16.0 g/dL    Hct 43.7 37.0 - 47.0 %    MCV 95.4 (H) 80.0 - 94.0 fL    MCH 31.2 pg    MCHC 32.7 (L) 33.0 - 36.0 g/dL    RDW 13.2 11.5 - 17.0 %    Platelet 272 130 - 400 x10(3)/mcL    MPV 9.6 7.4 - 10.4 fL    Neut % 55.1 %    Lymph % 35.8 %    Mono % 7.6 %    Eos % 1.0 %    Basophil % 0.3 %    Lymph # 2.06 0.6 - 4.6 x10(3)/mcL    Neut # 3.17 2.1 - 9.2 x10(3)/mcL    Mono # 0.44 0.1 - 1.3 x10(3)/mcL    Eos # 0.06 0 - 0.9 x10(3)/mcL    Baso # 0.02 0 - 0.2 x10(3)/mcL    IG# 0.01 0 - 0.04 x10(3)/mcL    IG% 0.2 %    NRBC% 0.0 %       Assessment:       Problem List Items Addressed This Visit          Psychiatric    Anxiety       Endocrine    Hypothyroidism    Morbid (severe) obesity due to excess calories    Current Assessment & Plan     Reduced calorie diet modification  Frequent self weighing   Exercise/lifestyle modification          Other Visit Diagnoses       Wellness examination    -  Primary               Plan:   1. Wellness examination  Lab work discussed with patient  Continue current medication  Continue diet/exercise  Return to clinic with any concerns    2. Morbid (severe) obesity  due to excess calories  Assessment & Plan:  Reduced calorie diet modification  Frequent self weighing   Exercise/lifestyle modification    3. Hypothyroidism, unspecified type  Lab work discussed with patient   Patient defers medication change at this time   Keep scheduled appointment with NP in [place]  Return to clinic with any concerns    4. Anxiety  Continue current medication   Relaxation technique  Monitor   Return to clinic with any concerns

## 2023-05-23 DIAGNOSIS — F41.9 ANXIETY: ICD-10-CM

## 2023-05-23 RX ORDER — VENLAFAXINE HYDROCHLORIDE 75 MG/1
CAPSULE, EXTENDED RELEASE ORAL
Qty: 90 CAPSULE | Refills: 1 | Status: SHIPPED | OUTPATIENT
Start: 2023-05-23 | End: 2023-09-11

## 2023-08-03 ENCOUNTER — HOSPITAL ENCOUNTER (OUTPATIENT)
Dept: RADIOLOGY | Facility: HOSPITAL | Age: 53
Discharge: HOME | End: 2023-08-03
Attending: STUDENT IN AN ORGANIZED HEALTH CARE EDUCATION/TRAINING PROGRAM
Payer: COMMERCIAL

## 2023-08-03 DIAGNOSIS — Z12.31 ENCOUNTER FOR SCREENING MAMMOGRAM FOR MALIGNANT NEOPLASM OF BREAST: ICD-10-CM

## 2023-08-03 PROCEDURE — 77067 SCR MAMMO BI INCL CAD: CPT | Mod: 26,,, | Performed by: RADIOLOGY

## 2023-08-03 PROCEDURE — 77063 MAMMO DIGITAL SCREENING BILAT WITH TOMO: ICD-10-PCS | Mod: 26,,, | Performed by: RADIOLOGY

## 2023-08-03 PROCEDURE — 77067 MAMMO DIGITAL SCREENING BILAT WITH TOMO: ICD-10-PCS | Mod: 26,,, | Performed by: RADIOLOGY

## 2023-08-03 PROCEDURE — 77063 BREAST TOMOSYNTHESIS BI: CPT | Mod: 26,,, | Performed by: RADIOLOGY

## 2023-08-03 PROCEDURE — 77067 SCR MAMMO BI INCL CAD: CPT | Mod: TC

## 2023-08-08 ENCOUNTER — HOSPITAL ENCOUNTER (OUTPATIENT)
Dept: RADIOLOGY | Facility: HOSPITAL | Age: 53
Discharge: HOME | End: 2023-08-08
Attending: STUDENT IN AN ORGANIZED HEALTH CARE EDUCATION/TRAINING PROGRAM
Payer: COMMERCIAL

## 2023-08-08 VITALS — HEIGHT: 62 IN | BODY MASS INDEX: 32.76 KG/M2 | WEIGHT: 178 LBS

## 2023-08-08 DIAGNOSIS — R92.8 ABNORMAL SCREENING MAMMOGRAM: ICD-10-CM

## 2023-08-08 PROCEDURE — 76642 ULTRASOUND BREAST LIMITED: CPT | Mod: TC,RT

## 2023-08-08 PROCEDURE — 76642 ULTRASOUND BREAST LIMITED: CPT | Mod: 26,RT,, | Performed by: RADIOLOGY

## 2023-08-08 PROCEDURE — 76642 US BREAST RIGHT LIMITED: ICD-10-PCS | Mod: 26,RT,, | Performed by: RADIOLOGY

## 2023-09-10 DIAGNOSIS — F41.9 ANXIETY: ICD-10-CM

## 2023-09-11 RX ORDER — VENLAFAXINE HYDROCHLORIDE 75 MG/1
CAPSULE, EXTENDED RELEASE ORAL
Qty: 90 CAPSULE | Refills: 1 | Status: SHIPPED | OUTPATIENT
Start: 2023-09-11 | End: 2023-12-05

## 2023-11-09 ENCOUNTER — ANESTHESIA EVENT (OUTPATIENT)
Dept: SURGERY | Facility: HOSPITAL | Age: 53
End: 2023-11-09
Payer: COMMERCIAL

## 2023-11-10 ENCOUNTER — LAB VISIT (OUTPATIENT)
Dept: LAB | Facility: HOSPITAL | Age: 53
End: 2023-11-10
Attending: STUDENT IN AN ORGANIZED HEALTH CARE EDUCATION/TRAINING PROGRAM
Payer: COMMERCIAL

## 2023-11-10 DIAGNOSIS — N95.0 POSTMENOPAUSAL BLEEDING: Primary | ICD-10-CM

## 2023-11-10 DIAGNOSIS — E34.9 ENDOCRINE DISORDER RELATED TO PUBERTY: ICD-10-CM

## 2023-11-10 DIAGNOSIS — R73.9 BLOOD GLUCOSE ELEVATED: ICD-10-CM

## 2023-11-10 DIAGNOSIS — E03.9 HYPOTHYROIDISM, ADULT: ICD-10-CM

## 2023-11-10 DIAGNOSIS — N95.0 PMB (POSTMENOPAUSAL BLEEDING): ICD-10-CM

## 2023-11-10 DIAGNOSIS — N95.1 SYMPTOMATIC MENOPAUSAL OR FEMALE CLIMACTERIC STATES: ICD-10-CM

## 2023-11-10 DIAGNOSIS — Z79.899 ENCOUNTER FOR LONG-TERM (CURRENT) USE OF OTHER MEDICATIONS: ICD-10-CM

## 2023-11-10 LAB
ALBUMIN SERPL-MCNC: 4 G/DL (ref 3.5–5)
ALBUMIN/GLOB SERPL: 1.5 RATIO (ref 1.1–2)
ALP SERPL-CCNC: 78 UNIT/L (ref 40–150)
ALT SERPL-CCNC: 22 UNIT/L (ref 0–55)
AST SERPL-CCNC: 18 UNIT/L (ref 5–34)
BILIRUB SERPL-MCNC: 0.5 MG/DL
BUN SERPL-MCNC: 11.3 MG/DL (ref 9.8–20.1)
CALCIUM SERPL-MCNC: 9.2 MG/DL (ref 8.4–10.2)
CHLORIDE SERPL-SCNC: 103 MMOL/L (ref 98–107)
CO2 SERPL-SCNC: 30 MMOL/L (ref 22–29)
CREAT SERPL-MCNC: 0.66 MG/DL (ref 0.55–1.02)
ERYTHROCYTE [DISTWIDTH] IN BLOOD BY AUTOMATED COUNT: 13.4 % (ref 11.5–17)
EST. AVERAGE GLUCOSE BLD GHB EST-MCNC: 96.8 MG/DL
ESTRADIOL SERPL HS-MCNC: 48 PG/ML
FSH SERPL-ACNC: 43.65 MIU/ML
GFR SERPLBLD CREATININE-BSD FMLA CKD-EPI: >60 MLS/MIN/1.73/M2
GLOBULIN SER-MCNC: 2.7 GM/DL (ref 2.4–3.5)
GLUCOSE SERPL-MCNC: 84 MG/DL (ref 74–100)
GROUP & RH: NORMAL
HBA1C MFR BLD: 5 %
HCT VFR BLD AUTO: 42.2 % (ref 37–47)
HGB BLD-MCNC: 14.2 G/DL (ref 12–16)
INDIRECT COOMBS GEL: NORMAL
MCH RBC QN AUTO: 32 PG (ref 27–31)
MCHC RBC AUTO-ENTMCNC: 33.6 G/DL (ref 33–36)
MCV RBC AUTO: 95 FL (ref 80–94)
NRBC BLD AUTO-RTO: 0 %
PLATELET # BLD AUTO: 268 X10(3)/MCL (ref 130–400)
PMV BLD AUTO: 9.6 FL (ref 7.4–10.4)
POTASSIUM SERPL-SCNC: 4.3 MMOL/L (ref 3.5–5.1)
PROGEST SERPL-MCNC: <0.5 NG/ML
PROT SERPL-MCNC: 6.7 GM/DL (ref 6.4–8.3)
RBC # BLD AUTO: 4.44 X10(6)/MCL (ref 4.2–5.4)
SODIUM SERPL-SCNC: 139 MMOL/L (ref 136–145)
SPECIMEN OUTDATE: NORMAL
T3FREE SERPL-MCNC: 2.62 PG/ML (ref 1.58–3.91)
T4 FREE SERPL-MCNC: 1.09 NG/DL (ref 0.7–1.48)
TESTOST SERPL-MCNC: 34.21 NG/DL (ref 12.4–35.75)
TSH SERPL-ACNC: 0.02 UIU/ML (ref 0.35–4.94)
WBC # SPEC AUTO: 8.5 X10(3)/MCL (ref 4.5–11.5)

## 2023-11-10 PROCEDURE — 82670 ASSAY OF TOTAL ESTRADIOL: CPT

## 2023-11-10 PROCEDURE — 84403 ASSAY OF TOTAL TESTOSTERONE: CPT

## 2023-11-10 PROCEDURE — 36415 COLL VENOUS BLD VENIPUNCTURE: CPT

## 2023-11-10 PROCEDURE — 83001 ASSAY OF GONADOTROPIN (FSH): CPT

## 2023-11-10 PROCEDURE — 86850 RBC ANTIBODY SCREEN: CPT | Performed by: STUDENT IN AN ORGANIZED HEALTH CARE EDUCATION/TRAINING PROGRAM

## 2023-11-10 PROCEDURE — 85027 COMPLETE CBC AUTOMATED: CPT

## 2023-11-10 PROCEDURE — 83036 HEMOGLOBIN GLYCOSYLATED A1C: CPT

## 2023-11-10 PROCEDURE — 84481 FREE ASSAY (FT-3): CPT

## 2023-11-10 PROCEDURE — 84443 ASSAY THYROID STIM HORMONE: CPT

## 2023-11-10 PROCEDURE — 80053 COMPREHEN METABOLIC PANEL: CPT

## 2023-11-10 PROCEDURE — 93005 ELECTROCARDIOGRAM TRACING: CPT

## 2023-11-10 PROCEDURE — 84439 ASSAY OF FREE THYROXINE: CPT

## 2023-11-10 PROCEDURE — 84144 ASSAY OF PROGESTERONE: CPT

## 2023-11-14 DIAGNOSIS — R29.898 ANKLE WEAKNESS: Primary | ICD-10-CM

## 2023-11-14 NOTE — PRE-PROCEDURE INSTRUCTIONS
"[facility]: Outpatient Surgery  Preprocedure Instructions    Expectations: "Because of inconsistent procedure completion times, an unexpected wait may occur. The physicians would like you to be here to prepare in the event they run ahead of time. We will make you as comfortable as possible and keep you informed. We apologize in advance if this happens."     Your arrival time for your surgery or procedure is _5 am_____.  We ask patients to arrive about 2 hours before surgery to allow for enough time to review your health history & medications, start your IV, complete any outstanding labwork or tests, and meet your Anesthesiologist.    We are located at [facility], [address] [place] [address].    Enter through the [place] entrance next to the Emergency Room, and come to the 6th floor to the Outpatient Surgery Department.    If you are in need of a wheelchair the morning of surgery please call [phone] about 15 minutes before you arrive. Parking is available in our parking garage [place] [facility] between the hospital and [facility].      Visitory Policy:  You are allowed 2 adult visitors to be with you in the hospital. Please, no switching visitors in pre-op area. All hospital visitors should be in good current health.  No small children.  We will update you and your family hourly on the progression of surgery and any unexpected delays.      What to Bring:  Please have your ID, insurance cards, and all home medication bottles with you at check in.  Bring your CPAP machine if one is used at home.     Fasting:  Nothing to eat or drink after midnight the night before your procedure. This includes no ice, gum, hard candies, and/or tobacco products.    Medications:  Follow your doctor's instructions for taking any medications on the morning of your procedure.  If no instructions for taking medications were given, do not take any medications but bring your " medications in their bottles to your procedure check in.     Follow your doctor's preoperative instructions regarding skin prep, bowel prep, bathing, or showering prior to your procedure.  If any special soaps were provided to you, please use according to your doctor's instructions. If no instructions were given from your doctor, take a good bath or shower with antibacterial soap the night before and the morning of your procedure.  On the morning of procedure, wear loose, comfortable clothing.  No lotions, makeup, perfumes, colognes, deodorant, or jewelry to your procedure.  Removable items (glasses, contact lenses, dentures, retainers, hearing aids) need to be removed for your procedure.  Bring your storage containers for these items if you wear them.     Artificial nails, body jewelry, eyelash extensions, and/or hair extensions with metal clips are not allowed during your surgery.  If you currently wear any of these items, please arrange for them to be removed prior to your arrival to the hospital.     Outpatient or Same Day Surgeries:  Any patients receiving sedation/anesthesia are advised not to drive for 24 hours after their procedure.  We do not allow patients to drive themselves home after discharge.  If you are going home after your procedure, please have someone available to drive you home from the hospital.     You may call the Outpatient Surgery Department at [phone] with any questions or concerns.  We are looking forward to meeting you and taking great care of you for your procedure.  Thank you for choosing [facility] for your surgical needs.

## 2023-11-15 ENCOUNTER — ANESTHESIA (OUTPATIENT)
Dept: SURGERY | Facility: HOSPITAL | Age: 53
End: 2023-11-15
Payer: COMMERCIAL

## 2023-11-15 ENCOUNTER — HOSPITAL ENCOUNTER (OUTPATIENT)
Facility: HOSPITAL | Age: 53
Discharge: HOME | End: 2023-11-16
Attending: STUDENT IN AN ORGANIZED HEALTH CARE EDUCATION/TRAINING PROGRAM | Admitting: STUDENT IN AN ORGANIZED HEALTH CARE EDUCATION/TRAINING PROGRAM
Payer: COMMERCIAL

## 2023-11-15 DIAGNOSIS — N95.0 POSTMENOPAUSAL BLEEDING: ICD-10-CM

## 2023-11-15 LAB — ABORH RETYPE: NORMAL

## 2023-11-15 PROCEDURE — 37000008 HC ANESTHESIA 1ST 15 MINUTES: Performed by: STUDENT IN AN ORGANIZED HEALTH CARE EDUCATION/TRAINING PROGRAM

## 2023-11-15 PROCEDURE — D9220A PRA ANESTHESIA: Mod: ANES,,, | Performed by: ANESTHESIOLOGY

## 2023-11-15 PROCEDURE — 36000713 HC OR TIME LEV V EA ADD 15 MIN: Performed by: STUDENT IN AN ORGANIZED HEALTH CARE EDUCATION/TRAINING PROGRAM

## 2023-11-15 PROCEDURE — 63600175 PHARM REV CODE 636 W HCPCS: Performed by: STUDENT IN AN ORGANIZED HEALTH CARE EDUCATION/TRAINING PROGRAM

## 2023-11-15 PROCEDURE — 71000015 HC POSTOP RECOV 1ST HR: Performed by: STUDENT IN AN ORGANIZED HEALTH CARE EDUCATION/TRAINING PROGRAM

## 2023-11-15 PROCEDURE — 71000039 HC RECOVERY, EACH ADD'L HOUR: Performed by: STUDENT IN AN ORGANIZED HEALTH CARE EDUCATION/TRAINING PROGRAM

## 2023-11-15 PROCEDURE — 96374 THER/PROPH/DIAG INJ IV PUSH: CPT

## 2023-11-15 PROCEDURE — 25000003 PHARM REV CODE 250: Performed by: ANESTHESIOLOGY

## 2023-11-15 PROCEDURE — 96361 HYDRATE IV INFUSION ADD-ON: CPT | Mod: 59

## 2023-11-15 PROCEDURE — 27201423 OPTIME MED/SURG SUP & DEVICES STERILE SUPPLY: Performed by: STUDENT IN AN ORGANIZED HEALTH CARE EDUCATION/TRAINING PROGRAM

## 2023-11-15 PROCEDURE — 37000009 HC ANESTHESIA EA ADD 15 MINS: Performed by: STUDENT IN AN ORGANIZED HEALTH CARE EDUCATION/TRAINING PROGRAM

## 2023-11-15 PROCEDURE — 96376 TX/PRO/DX INJ SAME DRUG ADON: CPT | Mod: 59

## 2023-11-15 PROCEDURE — 36000712 HC OR TIME LEV V 1ST 15 MIN: Performed by: STUDENT IN AN ORGANIZED HEALTH CARE EDUCATION/TRAINING PROGRAM

## 2023-11-15 PROCEDURE — G0378 HOSPITAL OBSERVATION PER HR: HCPCS

## 2023-11-15 PROCEDURE — 25000003 PHARM REV CODE 250: Performed by: STUDENT IN AN ORGANIZED HEALTH CARE EDUCATION/TRAINING PROGRAM

## 2023-11-15 PROCEDURE — D9220A PRA ANESTHESIA: Mod: CRNA,,, | Performed by: STUDENT IN AN ORGANIZED HEALTH CARE EDUCATION/TRAINING PROGRAM

## 2023-11-15 PROCEDURE — D9220A PRA ANESTHESIA: ICD-10-PCS | Mod: CRNA,,, | Performed by: STUDENT IN AN ORGANIZED HEALTH CARE EDUCATION/TRAINING PROGRAM

## 2023-11-15 PROCEDURE — D9220A PRA ANESTHESIA: ICD-10-PCS | Mod: ANES,,, | Performed by: ANESTHESIOLOGY

## 2023-11-15 PROCEDURE — 71000033 HC RECOVERY, INTIAL HOUR: Performed by: STUDENT IN AN ORGANIZED HEALTH CARE EDUCATION/TRAINING PROGRAM

## 2023-11-15 RX ORDER — PROCHLORPERAZINE EDISYLATE 5 MG/ML
5 INJECTION INTRAMUSCULAR; INTRAVENOUS EVERY 6 HOURS PRN
Status: DISCONTINUED | OUTPATIENT
Start: 2023-11-15 | End: 2023-11-15

## 2023-11-15 RX ORDER — HYDROMORPHONE HYDROCHLORIDE 2 MG/ML
0.4 INJECTION, SOLUTION INTRAMUSCULAR; INTRAVENOUS; SUBCUTANEOUS EVERY 5 MIN PRN
Status: DISCONTINUED | OUTPATIENT
Start: 2023-11-15 | End: 2023-11-15

## 2023-11-15 RX ORDER — HYDROMORPHONE HYDROCHLORIDE 2 MG/ML
1 INJECTION, SOLUTION INTRAMUSCULAR; INTRAVENOUS; SUBCUTANEOUS EVERY 4 HOURS PRN
Status: DISCONTINUED | OUTPATIENT
Start: 2023-11-15 | End: 2023-11-16 | Stop reason: HOSPADM

## 2023-11-15 RX ORDER — SCOLOPAMINE TRANSDERMAL SYSTEM 1 MG/1
1 PATCH, EXTENDED RELEASE TRANSDERMAL
Status: DISCONTINUED | OUTPATIENT
Start: 2023-11-15 | End: 2023-11-16 | Stop reason: HOSPADM

## 2023-11-15 RX ORDER — DIPHENHYDRAMINE HCL 25 MG
25 CAPSULE ORAL EVERY 4 HOURS PRN
Status: DISCONTINUED | OUTPATIENT
Start: 2023-11-15 | End: 2023-11-16 | Stop reason: HOSPADM

## 2023-11-15 RX ORDER — OXYCODONE AND ACETAMINOPHEN 5; 325 MG/1; MG/1
1 TABLET ORAL EVERY 4 HOURS PRN
Qty: 12 TABLET | Refills: 0 | Status: SHIPPED | OUTPATIENT
Start: 2023-11-15 | End: 2023-12-05

## 2023-11-15 RX ORDER — PHENYLEPHRINE HCL IN 0.9% NACL 1 MG/10 ML
SYRINGE (ML) INTRAVENOUS
Status: DISCONTINUED | OUTPATIENT
Start: 2023-11-15 | End: 2023-11-15

## 2023-11-15 RX ORDER — DEXAMETHASONE SODIUM PHOSPHATE 4 MG/ML
INJECTION, SOLUTION INTRA-ARTICULAR; INTRALESIONAL; INTRAMUSCULAR; INTRAVENOUS; SOFT TISSUE
Status: DISCONTINUED | OUTPATIENT
Start: 2023-11-15 | End: 2023-11-15

## 2023-11-15 RX ORDER — ACETAMINOPHEN 500 MG
1000 TABLET ORAL
Status: COMPLETED | OUTPATIENT
Start: 2023-11-15 | End: 2023-11-15

## 2023-11-15 RX ORDER — ONDANSETRON 2 MG/ML
INJECTION INTRAMUSCULAR; INTRAVENOUS
Status: DISCONTINUED | OUTPATIENT
Start: 2023-11-15 | End: 2023-11-15

## 2023-11-15 RX ORDER — GLYCOPYRROLATE 0.2 MG/ML
INJECTION INTRAMUSCULAR; INTRAVENOUS
Status: DISCONTINUED | OUTPATIENT
Start: 2023-11-15 | End: 2023-11-15

## 2023-11-15 RX ORDER — IBUPROFEN 600 MG/1
600 TABLET ORAL EVERY 6 HOURS PRN
Status: DISCONTINUED | OUTPATIENT
Start: 2023-11-15 | End: 2023-11-16 | Stop reason: HOSPADM

## 2023-11-15 RX ORDER — KETOROLAC TROMETHAMINE 30 MG/ML
INJECTION, SOLUTION INTRAMUSCULAR; INTRAVENOUS
Status: DISCONTINUED | OUTPATIENT
Start: 2023-11-15 | End: 2023-11-15

## 2023-11-15 RX ORDER — LIDOCAINE HYDROCHLORIDE 10 MG/ML
1 INJECTION, SOLUTION EPIDURAL; INFILTRATION; INTRACAUDAL; PERINEURAL ONCE
Status: DISCONTINUED | OUTPATIENT
Start: 2023-11-15 | End: 2023-11-15 | Stop reason: HOSPADM

## 2023-11-15 RX ORDER — LIDOCAINE HYDROCHLORIDE 20 MG/ML
INJECTION INTRAVENOUS
Status: DISCONTINUED | OUTPATIENT
Start: 2023-11-15 | End: 2023-11-15

## 2023-11-15 RX ORDER — ONDANSETRON 2 MG/ML
4 INJECTION INTRAMUSCULAR; INTRAVENOUS DAILY PRN
Status: DISCONTINUED | OUTPATIENT
Start: 2023-11-15 | End: 2023-11-15

## 2023-11-15 RX ORDER — IBUPROFEN 600 MG/1
600 TABLET ORAL EVERY 6 HOURS PRN
Status: DISCONTINUED | OUTPATIENT
Start: 2023-11-16 | End: 2023-11-16 | Stop reason: HOSPADM

## 2023-11-15 RX ORDER — CEFAZOLIN SODIUM 2 G/50ML
2 SOLUTION INTRAVENOUS ONCE
Status: COMPLETED | OUTPATIENT
Start: 2023-11-15 | End: 2023-11-15

## 2023-11-15 RX ORDER — SIMETHICONE 80 MG
80 TABLET,CHEWABLE ORAL EVERY 4 HOURS PRN
Status: DISCONTINUED | OUTPATIENT
Start: 2023-11-15 | End: 2023-11-16 | Stop reason: HOSPADM

## 2023-11-15 RX ORDER — PROPOFOL 10 MG/ML
VIAL (ML) INTRAVENOUS
Status: DISCONTINUED | OUTPATIENT
Start: 2023-11-15 | End: 2023-11-15

## 2023-11-15 RX ORDER — OXYCODONE AND ACETAMINOPHEN 5; 325 MG/1; MG/1
1 TABLET ORAL EVERY 4 HOURS PRN
Status: DISCONTINUED | OUTPATIENT
Start: 2023-11-15 | End: 2023-11-16 | Stop reason: HOSPADM

## 2023-11-15 RX ORDER — IBUPROFEN 600 MG/1
600 TABLET ORAL 3 TIMES DAILY
Qty: 30 TABLET | Refills: 0 | Status: SHIPPED | OUTPATIENT
Start: 2023-11-15 | End: 2023-12-05

## 2023-11-15 RX ORDER — HYDROMORPHONE HYDROCHLORIDE 2 MG/ML
INJECTION, SOLUTION INTRAMUSCULAR; INTRAVENOUS; SUBCUTANEOUS
Status: DISCONTINUED | OUTPATIENT
Start: 2023-11-15 | End: 2023-11-15

## 2023-11-15 RX ORDER — KETOROLAC TROMETHAMINE 30 MG/ML
30 INJECTION, SOLUTION INTRAMUSCULAR; INTRAVENOUS EVERY 6 HOURS
Status: DISCONTINUED | OUTPATIENT
Start: 2023-11-15 | End: 2023-11-16 | Stop reason: HOSPADM

## 2023-11-15 RX ORDER — ENOXAPARIN SODIUM 100 MG/ML
40 INJECTION SUBCUTANEOUS EVERY 24 HOURS
Status: DISCONTINUED | OUTPATIENT
Start: 2023-11-16 | End: 2023-11-16 | Stop reason: HOSPADM

## 2023-11-15 RX ORDER — GABAPENTIN 300 MG/1
300 CAPSULE ORAL
Status: COMPLETED | OUTPATIENT
Start: 2023-11-15 | End: 2023-11-15

## 2023-11-15 RX ORDER — FENTANYL CITRATE 50 UG/ML
INJECTION, SOLUTION INTRAMUSCULAR; INTRAVENOUS
Status: DISCONTINUED | OUTPATIENT
Start: 2023-11-15 | End: 2023-11-15

## 2023-11-15 RX ORDER — ROCURONIUM BROMIDE 10 MG/ML
INJECTION, SOLUTION INTRAVENOUS
Status: DISCONTINUED | OUTPATIENT
Start: 2023-11-15 | End: 2023-11-15

## 2023-11-15 RX ORDER — DIPHENHYDRAMINE HYDROCHLORIDE 50 MG/ML
25 INJECTION INTRAMUSCULAR; INTRAVENOUS EVERY 6 HOURS PRN
Status: DISCONTINUED | OUTPATIENT
Start: 2023-11-15 | End: 2023-11-15

## 2023-11-15 RX ORDER — SODIUM CHLORIDE, SODIUM GLUCONATE, SODIUM ACETATE, POTASSIUM CHLORIDE AND MAGNESIUM CHLORIDE 30; 37; 368; 526; 502 MG/100ML; MG/100ML; MG/100ML; MG/100ML; MG/100ML
INJECTION, SOLUTION INTRAVENOUS CONTINUOUS
Status: DISCONTINUED | OUTPATIENT
Start: 2023-11-15 | End: 2023-11-15

## 2023-11-15 RX ORDER — MIDAZOLAM HYDROCHLORIDE 1 MG/ML
2 INJECTION INTRAMUSCULAR; INTRAVENOUS ONCE AS NEEDED
Status: DISCONTINUED | OUTPATIENT
Start: 2023-11-15 | End: 2023-11-15 | Stop reason: HOSPADM

## 2023-11-15 RX ORDER — MIDAZOLAM HYDROCHLORIDE 1 MG/ML
INJECTION INTRAMUSCULAR; INTRAVENOUS
Status: DISCONTINUED | OUTPATIENT
Start: 2023-11-15 | End: 2023-11-15

## 2023-11-15 RX ORDER — METOCLOPRAMIDE HYDROCHLORIDE 5 MG/ML
10 INJECTION INTRAMUSCULAR; INTRAVENOUS EVERY 10 MIN PRN
Status: DISCONTINUED | OUTPATIENT
Start: 2023-11-15 | End: 2023-11-15

## 2023-11-15 RX ORDER — ONDANSETRON 4 MG/1
4 TABLET, ORALLY DISINTEGRATING ORAL ONCE
Status: COMPLETED | OUTPATIENT
Start: 2023-11-15 | End: 2023-11-15

## 2023-11-15 RX ORDER — ONDANSETRON 4 MG/1
8 TABLET, ORALLY DISINTEGRATING ORAL EVERY 8 HOURS PRN
Status: DISCONTINUED | OUTPATIENT
Start: 2023-11-15 | End: 2023-11-16 | Stop reason: HOSPADM

## 2023-11-15 RX ORDER — SODIUM CHLORIDE, SODIUM LACTATE, POTASSIUM CHLORIDE, CALCIUM CHLORIDE 600; 310; 30; 20 MG/100ML; MG/100ML; MG/100ML; MG/100ML
INJECTION, SOLUTION INTRAVENOUS CONTINUOUS
Status: DISCONTINUED | OUTPATIENT
Start: 2023-11-15 | End: 2023-11-16 | Stop reason: HOSPADM

## 2023-11-15 RX ADMIN — CEFAZOLIN SODIUM 2 G: 2 SOLUTION INTRAVENOUS at 07:11

## 2023-11-15 RX ADMIN — Medication 50 MCG: at 08:11

## 2023-11-15 RX ADMIN — ROCURONIUM BROMIDE 50 MG: 10 SOLUTION INTRAVENOUS at 07:11

## 2023-11-15 RX ADMIN — ROCURONIUM BROMIDE 25 MG: 10 SOLUTION INTRAVENOUS at 08:11

## 2023-11-15 RX ADMIN — GLYCOPYRROLATE 0.2 MG: 0.2 INJECTION INTRAMUSCULAR; INTRAVENOUS at 07:11

## 2023-11-15 RX ADMIN — LIDOCAINE HYDROCHLORIDE 80 MG: 20 INJECTION INTRAVENOUS at 07:11

## 2023-11-15 RX ADMIN — ONDANSETRON 4 MG: 4 TABLET, ORALLY DISINTEGRATING ORAL at 06:11

## 2023-11-15 RX ADMIN — ACETAMINOPHEN 1000 MG: 500 TABLET ORAL at 06:11

## 2023-11-15 RX ADMIN — HYDROMORPHONE HYDROCHLORIDE 1 MG: 2 INJECTION, SOLUTION INTRAMUSCULAR; INTRAVENOUS; SUBCUTANEOUS at 08:11

## 2023-11-15 RX ADMIN — MIDAZOLAM HYDROCHLORIDE 2 MG: 1 INJECTION, SOLUTION INTRAMUSCULAR; INTRAVENOUS at 07:11

## 2023-11-15 RX ADMIN — SODIUM CHLORIDE, SODIUM GLUCONATE, SODIUM ACETATE, POTASSIUM CHLORIDE AND MAGNESIUM CHLORIDE: 526; 502; 368; 37; 30 INJECTION, SOLUTION INTRAVENOUS at 08:11

## 2023-11-15 RX ADMIN — SODIUM CHLORIDE, POTASSIUM CHLORIDE, SODIUM LACTATE AND CALCIUM CHLORIDE: 600; 310; 30; 20 INJECTION, SOLUTION INTRAVENOUS at 12:11

## 2023-11-15 RX ADMIN — SUGAMMADEX 200 MG: 100 INJECTION, SOLUTION INTRAVENOUS at 08:11

## 2023-11-15 RX ADMIN — KETOROLAC TROMETHAMINE 30 MG: 30 INJECTION, SOLUTION INTRAMUSCULAR; INTRAVENOUS at 02:11

## 2023-11-15 RX ADMIN — FENTANYL CITRATE 100 MCG: 50 INJECTION, SOLUTION INTRAMUSCULAR; INTRAVENOUS at 07:11

## 2023-11-15 RX ADMIN — PROPOFOL 175 MG: 10 INJECTION, EMULSION INTRAVENOUS at 07:11

## 2023-11-15 RX ADMIN — SCOPOLAMINE 1 PATCH: 1 PATCH TRANSDERMAL at 06:11

## 2023-11-15 RX ADMIN — SODIUM CHLORIDE, SODIUM GLUCONATE, SODIUM ACETATE, POTASSIUM CHLORIDE AND MAGNESIUM CHLORIDE: 526; 502; 368; 37; 30 INJECTION, SOLUTION INTRAVENOUS at 07:11

## 2023-11-15 RX ADMIN — ONDANSETRON 4 MG: 2 INJECTION INTRAMUSCULAR; INTRAVENOUS at 08:11

## 2023-11-15 RX ADMIN — KETOROLAC TROMETHAMINE 30 MG: 30 INJECTION, SOLUTION INTRAMUSCULAR; INTRAVENOUS at 07:11

## 2023-11-15 RX ADMIN — KETOROLAC TROMETHAMINE 30 MG: 30 INJECTION, SOLUTION INTRAMUSCULAR; INTRAVENOUS at 08:11

## 2023-11-15 RX ADMIN — Medication 50 MCG: at 07:11

## 2023-11-15 RX ADMIN — GABAPENTIN 300 MG: 300 CAPSULE ORAL at 06:11

## 2023-11-15 RX ADMIN — DEXAMETHASONE SODIUM PHOSPHATE 8 MG: 4 INJECTION, SOLUTION INTRA-ARTICULAR; INTRALESIONAL; INTRAMUSCULAR; INTRAVENOUS; SOFT TISSUE at 07:11

## 2023-11-15 NOTE — ANESTHESIA PREPROCEDURE EVALUATION
11/15/2023  Siobhan Ko is a 53 y.o., female.  Who presents with Postmenopausal bleeding despite conservative measures to prevent. She desires Surgical approach/Hysterectomy.      Diagnosis:   Postmenopausal bleeding [N95.0]   Pre-op diagnosis: Postmenopausal bleeding [N95.0]       The pt. comes to [facility] for the noted procedure under [facility].  Procedure:   XI ROBOTIC HYSTERECTOMY (Abdomen) - RALH / BSO   Anesthesia type: General         PMHx:  Other Medical History   Hypothyroidism, unspecified Anxiety disorder, unspecified   Sleep apnea Appendicitis   Bone spur of foot Broken leg   Postmenopausal bleeding Obesity     Surgical History:  APPENDECTOMY TUBAL LIGATION   FOOT SURGERY FRACTURE SURGERY   HYSTEROSCOPY WITH DILATION AND CURETTAGE OF UTERUS HYSTEROSCOPIC POLYPECTOMY OF UTERUS   COLONOSCOPY LASIK       Vital signs:        Lab Data:      EKG:            Pre-op Assessment    I have reviewed the Patient Summary Reports.     I have reviewed the Nursing Notes. I have reviewed the NPO Status.   I have reviewed the Medications.     Review of Systems  Anesthesia Hx:  No problems with previous Anesthesia                Social:  Non-Smoker       Hematology/Oncology:  Hematology Normal   Oncology Normal                                   EENT/Dental:  EENT/Dental Normal           Cardiovascular:  Cardiovascular Normal Exercise tolerance: good                   Functional Capacity good / => 4 METS                         Pulmonary:  Pulmonary Normal                       Renal/:  Renal/ Normal                 Hepatic/GI:  Hepatic/GI Normal                 Musculoskeletal:  Musculoskeletal Normal                Neurological:  Neurology Normal                                      Endocrine:  Endocrine Normal            Dermatological:  Skin Normal    Psych:  Psychiatric Normal                    Physical  Exam  General: Alert, Oriented, Well nourished and Cooperative    Airway:  Mallampati: II   Mouth Opening: Normal  TM Distance: Normal  Tongue: Normal  Neck ROM: Normal ROM    Dental:  Intact    Chest/Lungs:  Clear to auscultation, Normal Respiratory Rate    Heart:  Rate: Normal  Rhythm: Regular Rhythm        Anesthesia Plan  Type of Anesthesia, risks & benefits discussed:    Anesthesia Type: Gen ETT  Intra-op Monitoring Plan: Standard ASA Monitors  Post Op Pain Control Plan: IV/PO Opioids PRN  Induction:  IV and Inhalation  Airway Plan: Direct  Informed Consent: Informed consent signed with the Patient and all parties understand the risks and agree with anesthesia plan.  All questions answered. Patient consented to blood products? Yes  ASA Score: 2  Day of Surgery Review of History & Physical: H&P Update referred to the surgeon/provider.    Ready For Surgery From Anesthesia Perspective.     .

## 2023-11-15 NOTE — ANESTHESIA POSTPROCEDURE EVALUATION
Anesthesia Post Evaluation    Patient: Siobhan Ko    Procedure(s) Performed: Procedure(s) (LRB):  XI ROBOTIC HYSTERECTOMY (N/A)    Final Anesthesia Type: general      Patient location during evaluation: PACU  Patient participation: Yes- Able to Participate  Level of consciousness: awake and alert and oriented  Post-procedure vital signs: reviewed and stable  Pain management: adequate  Airway patency: patent  NATHANAEL mitigation strategies: Verification of full reversal of neuromuscular block  PONV status at discharge: No PONV  Anesthetic complications: no      Cardiovascular status: blood pressure returned to baseline and stable  Respiratory status: spontaneous ventilation and unassisted  Hydration status: euvolemic  Follow-up not needed.  Comments: [facility]          Vitals Value Taken Time   /61 11/15/23 1142   Temp 36.2 °C (97.2 °F) 11/15/23 0900   Pulse 74 11/15/23 1142   Resp 14 11/15/23 1142   SpO2 92 % 11/15/23 1142         Event Time   Out of Recovery 11:42:00         Pain/Alannah Score: Pain Rating Prior to Med Admin: 0 (11/15/2023  2:10 PM)  Alannah Score: 8 (11/15/2023 11:30 AM)

## 2023-11-15 NOTE — ANESTHESIA PROCEDURE NOTES
Intubation    Date/Time: 11/15/2023 7:21 AM    Performed by: Imer Tillman CRNA  Authorized by: Abel Rooney MD    Intubation:     Induction:  Intravenous    Intubated:  Postinduction    Mask Ventilation:  Easy mask    Attempts:  1    Attempted By:  Student    Method of Intubation:  Direct    Blade:  Kumar 2    Laryngeal View Grade: Grade I - full view of cords      Difficult Airway Encountered?: No      Complications:  None    Airway Device:  Oral endotracheal tube    Airway Device Size:  7.0    Style/Cuff Inflation:  Cuffed (inflated to minimal occlusive pressure)    Tube secured:  21    Secured at:  The lips    Placement Verified By:  Capnometry    Complicating Factors:  None    Findings Post-Intubation:  BS equal bilateral and atraumatic/condition of teeth unchanged

## 2023-11-15 NOTE — PLAN OF CARE
"  Problem: Adult Inpatient Plan of Care  Goal: Plan of Care Review  Outcome: Ongoing, Progressing  Goal: Patient-Specific Goal (Individualized)  Description: "I want to minimize my pain"  Outcome: Ongoing, Progressing  Goal: Absence of Hospital-Acquired Illness or Injury  Outcome: Ongoing, Progressing  Goal: Optimal Comfort and Wellbeing  Outcome: Ongoing, Progressing  Goal: Readiness for Transition of Care  Outcome: Ongoing, Progressing     Problem: Infection  Goal: Absence of Infection Signs and Symptoms  Outcome: Ongoing, Progressing     Problem: Pain Acute  Goal: Acceptable Pain Control and Functional Ability  Outcome: Ongoing, Progressing     "

## 2023-11-15 NOTE — H&P
Pt seen and examind.  No changed to H&P.  Persistent PMB despite hormonal and surgical intervention.  Plan for RALH/BSO

## 2023-11-15 NOTE — OP NOTE
[facility]                       [address] [id number]    PATIENT NAME:      SHELIA REYES  YOB: 1970  CSN:               [id number]  MRN:               [id number]  ADMIT DATE:        11/15/2023 05:15:00  PHYSICIAN:         Song Gomes MD                          OPERATIVE REPORT      DATE OF SURGERY:    11/15/2023 00:00:00    SURGEON:  Song Gomes MD    ASSISTANT SURGEON:  Yogi Gomes MD    PROCEDURE:  Robotic assisted laparoscopic hysterectomy and bilateral   salpingo-oophorectomy.    PREOPERATIVE DIAGNOSIS:  Persistent postmenopausal bleeding.    POSTOPERATIVE DIAGNOSIS:  Persistent postmenopausal bleeding.    COMPLICATIONS:  None.    FINDINGS:  Normal appearing uterus, tubes, and ovaries.    BLOOD LOSS:  5 mL.    SPECIMENS:  Uterus, fallopian tubes, ovaries, and cervix.    INDICATION FOR PROCEDURE:  Shelia is a patient of mine, I have seen for a few   years now.  She initially presented after seeing a hormone specialist in [place] with postmenopausal bleeding.  This was stopped once she discontinued   the hormones, but started back again.  A dilation and curettage was performed   with the hysteroscope and no evidence of any abnormal pathology was found on   that.  The patient strongly desires to continue hormonal therapy, but even on   just progesterone she had these episodes of bleeding, which are bothersome to   her and affecting her life.  I did discuss that with continued postmenopausal   bleeding, she was a candidate for removing the uterus and she was very much   interested in that.  Risks, benefits, alternatives of this were discussed on a   few different visits and she desired to proceed.  She desired ovarian removal   considering her age, which I was in agreement with.    PROCEDURE IN DETAIL:  The patient was taken to the operating room with IV fluids   running.   She was placed in the dorsal lithotomy position after general   anesthesia was administered.  She was prepped and draped in usual sterile   fashion.  Attention was turned to the vagina where a BOSTON manipulator was placed   without any difficulty.  Attention was turned to the abdomen where a   supraumbilical incision was made.  The Veress needle used to gain abdominal   entry.  Two clicks were heard, negative opening pressure was verified.    Insufflation was started.  Next, an 8 mm trocar was placed and then the camera.    There was no evidence of visceral or vascular injury at this time.  Next, 2   lateral pelvic robotic ports were placed under direct visualization of the   camera as well as a left upper quadrant assistant port.  Once this was done, the   robot was docked.  Next, starting on the left side, the left UO was cauterized   and cut.  The left round ligament was cauterized and cut.  The left uterine   artery was skeletonized and bladder flap created.  This was done on the right   side as well, visualizing the ureters during the entire time.  After this was   done, using vessel sealer and hook cautery the colpotomy was made.  The uterus   was removed from the vagina without any difficulty.  The vaginal cuff was closed   in a running fashion with 2-0 Stratafix suture.  Hemostasis was excellent.    Surgicel powder placed in the cuff bed.  All instruments were removed from the   patient's abdomen.  Insufflation was stopped.  Air was let out.  The abdominal   incisions were closed with 4-0 Monocryl suture.  The vagina was then rechecked   for any bleeding.  The vaginal cuff was found to be hemostatic and there was no   evidence of bleeding from the vagina.  The patient tolerated the procedure well.    All sponge, needle, lap counts were correct x2.        ______________________________  MD YUE Alvarez/ALANNAH  DD:  11/15/2023  Time:  09:02AM  DT:  11/15/2023  Time:  09:25AM  Job #:   [id number]      OPERATIVE REPORT

## 2023-11-16 VITALS
HEIGHT: 62 IN | WEIGHT: 181.69 LBS | RESPIRATION RATE: 18 BRPM | BODY MASS INDEX: 33.43 KG/M2 | HEART RATE: 72 BPM | OXYGEN SATURATION: 92 % | SYSTOLIC BLOOD PRESSURE: 116 MMHG | DIASTOLIC BLOOD PRESSURE: 64 MMHG | TEMPERATURE: 98 F

## 2023-11-16 LAB
BASOPHILS # BLD AUTO: 0.02 X10(3)/MCL
BASOPHILS NFR BLD AUTO: 0.1 %
EOSINOPHIL # BLD AUTO: 0.01 X10(3)/MCL (ref 0–0.9)
EOSINOPHIL NFR BLD AUTO: 0.1 %
ERYTHROCYTE [DISTWIDTH] IN BLOOD BY AUTOMATED COUNT: 14 % (ref 11.5–17)
HCT VFR BLD AUTO: 37.8 % (ref 37–47)
HGB BLD-MCNC: 12.7 G/DL (ref 12–16)
IMM GRANULOCYTES # BLD AUTO: 0.06 X10(3)/MCL (ref 0–0.04)
IMM GRANULOCYTES NFR BLD AUTO: 0.4 %
LYMPHOCYTES # BLD AUTO: 3.52 X10(3)/MCL (ref 0.6–4.6)
LYMPHOCYTES NFR BLD AUTO: 25.1 %
MCH RBC QN AUTO: 31.4 PG (ref 27–31)
MCHC RBC AUTO-ENTMCNC: 33.6 G/DL (ref 33–36)
MCV RBC AUTO: 93.6 FL (ref 80–94)
MONOCYTES # BLD AUTO: 0.8 X10(3)/MCL (ref 0.1–1.3)
MONOCYTES NFR BLD AUTO: 5.7 %
NEUTROPHILS # BLD AUTO: 9.61 X10(3)/MCL (ref 2.1–9.2)
NEUTROPHILS NFR BLD AUTO: 68.6 %
NRBC BLD AUTO-RTO: 0 %
PLATELET # BLD AUTO: 268 X10(3)/MCL (ref 130–400)
PMV BLD AUTO: 9.5 FL (ref 7.4–10.4)
PSYCHE PATHOLOGY RESULT: NORMAL
RBC # BLD AUTO: 4.04 X10(6)/MCL (ref 4.2–5.4)
WBC # SPEC AUTO: 14.02 X10(3)/MCL (ref 4.5–11.5)

## 2023-11-16 PROCEDURE — 25000003 PHARM REV CODE 250: Performed by: STUDENT IN AN ORGANIZED HEALTH CARE EDUCATION/TRAINING PROGRAM

## 2023-11-16 PROCEDURE — 96376 TX/PRO/DX INJ SAME DRUG ADON: CPT

## 2023-11-16 PROCEDURE — G0378 HOSPITAL OBSERVATION PER HR: HCPCS

## 2023-11-16 PROCEDURE — 85025 COMPLETE CBC W/AUTO DIFF WBC: CPT | Performed by: STUDENT IN AN ORGANIZED HEALTH CARE EDUCATION/TRAINING PROGRAM

## 2023-11-16 PROCEDURE — 63600175 PHARM REV CODE 636 W HCPCS: Performed by: STUDENT IN AN ORGANIZED HEALTH CARE EDUCATION/TRAINING PROGRAM

## 2023-11-16 RX ADMIN — KETOROLAC TROMETHAMINE 30 MG: 30 INJECTION, SOLUTION INTRAMUSCULAR; INTRAVENOUS at 02:11

## 2023-11-16 RX ADMIN — IBUPROFEN 600 MG: 600 TABLET, FILM COATED ORAL at 07:11

## 2023-11-16 NOTE — PLAN OF CARE
"  Problem: Adult Inpatient Plan of Care  Goal: Plan of Care Review  Outcome: Met  Goal: Patient-Specific Goal (Individualized)  Description: "I want to minimize my pain"  Outcome: Met  Goal: Absence of Hospital-Acquired Illness or Injury  Outcome: Met  Goal: Optimal Comfort and Wellbeing  Outcome: Met  Goal: Readiness for Transition of Care  Outcome: Met     Problem: Infection  Goal: Absence of Infection Signs and Symptoms  Outcome: Met     Problem: Pain Acute  Goal: Acceptable Pain Control and Functional Ability  Outcome: Met     "

## 2023-11-16 NOTE — NURSING
Discharge instructions reviewed and discussed with patient and spouse; questions answered.  Patient stating MD advised her to follow-up in 2 weeks for post-op appointment.  Rx called in per MD and patient picked up prescriptions.

## 2023-11-21 NOTE — DISCHARGE SUMMARY
[facility] - 3rd Floor Mother/Baby Unit  Discharge Note  Short Stay    Procedure(s) (LRB):  XI ROBOTIC HYSTERECTOMY (N/A)      OUTCOME: Patient tolerated treatment/procedure well without complication and is now ready for discharge.    DISPOSITION: Home or Self Care    FINAL DIAGNOSIS:  Postmenopausal bleeding    FOLLOWUP: In clinic    DISCHARGE INSTRUCTIONS:  No discharge procedures on file.      Clinical Reference Documents Added to Patient Instructions         Document    HYSTERECTOMY DISCHARGE INSTRUCTIONS (ENGLISH)    HYSTERECTOMY, ABDOMINAL OR LAPAROSCOPIC SURGERY (ENGLISH)            TIME SPENT ON DISCHARGE: 10 minutes

## 2023-12-05 ENCOUNTER — OFFICE VISIT (OUTPATIENT)
Dept: FAMILY MEDICINE | Facility: CLINIC | Age: 53
End: 2023-12-05
Payer: COMMERCIAL

## 2023-12-05 VITALS
TEMPERATURE: 98 F | DIASTOLIC BLOOD PRESSURE: 76 MMHG | SYSTOLIC BLOOD PRESSURE: 124 MMHG | OXYGEN SATURATION: 98 % | BODY MASS INDEX: 33.86 KG/M2 | HEART RATE: 89 BPM | RESPIRATION RATE: 18 BRPM | HEIGHT: 62 IN | WEIGHT: 184 LBS

## 2023-12-05 DIAGNOSIS — F41.9 ANXIETY: Primary | ICD-10-CM

## 2023-12-05 PROCEDURE — 99214 OFFICE O/P EST MOD 30 MIN: CPT | Mod: ,,, | Performed by: FAMILY MEDICINE

## 2023-12-05 PROCEDURE — 3078F DIAST BP <80 MM HG: CPT | Mod: CPTII,,, | Performed by: FAMILY MEDICINE

## 2023-12-05 PROCEDURE — 99214 PR OFFICE/OUTPT VISIT, EST, LEVL IV, 30-39 MIN: ICD-10-PCS | Mod: ,,, | Performed by: FAMILY MEDICINE

## 2023-12-05 PROCEDURE — 3008F BODY MASS INDEX DOCD: CPT | Mod: CPTII,,, | Performed by: FAMILY MEDICINE

## 2023-12-05 PROCEDURE — 3074F SYST BP LT 130 MM HG: CPT | Mod: CPTII,,, | Performed by: FAMILY MEDICINE

## 2023-12-05 PROCEDURE — 3074F PR MOST RECENT SYSTOLIC BLOOD PRESSURE < 130 MM HG: ICD-10-PCS | Mod: CPTII,,, | Performed by: FAMILY MEDICINE

## 2023-12-05 PROCEDURE — 3008F PR BODY MASS INDEX (BMI) DOCUMENTED: ICD-10-PCS | Mod: CPTII,,, | Performed by: FAMILY MEDICINE

## 2023-12-05 PROCEDURE — 1159F MED LIST DOCD IN RCRD: CPT | Mod: CPTII,,, | Performed by: FAMILY MEDICINE

## 2023-12-05 PROCEDURE — 3044F PR MOST RECENT HEMOGLOBIN A1C LEVEL <7.0%: ICD-10-PCS | Mod: CPTII,,, | Performed by: FAMILY MEDICINE

## 2023-12-05 PROCEDURE — 1160F PR REVIEW ALL MEDS BY PRESCRIBER/CLIN PHARMACIST DOCUMENTED: ICD-10-PCS | Mod: CPTII,,, | Performed by: FAMILY MEDICINE

## 2023-12-05 PROCEDURE — 1159F PR MEDICATION LIST DOCUMENTED IN MEDICAL RECORD: ICD-10-PCS | Mod: CPTII,,, | Performed by: FAMILY MEDICINE

## 2023-12-05 PROCEDURE — 3078F PR MOST RECENT DIASTOLIC BLOOD PRESSURE < 80 MM HG: ICD-10-PCS | Mod: CPTII,,, | Performed by: FAMILY MEDICINE

## 2023-12-05 PROCEDURE — 3044F HG A1C LEVEL LT 7.0%: CPT | Mod: CPTII,,, | Performed by: FAMILY MEDICINE

## 2023-12-05 PROCEDURE — 1160F RVW MEDS BY RX/DR IN RCRD: CPT | Mod: CPTII,,, | Performed by: FAMILY MEDICINE

## 2023-12-05 RX ORDER — ESCITALOPRAM OXALATE 10 MG/1
10 TABLET ORAL DAILY
Qty: 30 TABLET | Refills: 1 | Status: SHIPPED | OUTPATIENT
Start: 2023-12-05 | End: 2023-12-27

## 2023-12-05 NOTE — ADDENDUM NOTE
Addendum  created 12/05/23 0824 by Abel Rooney MD    Attestation recorded in Intraprocedure, Intraprocedure Attestations filed, Intraprocedure Event edited

## 2023-12-05 NOTE — PROGRESS NOTES
"Subjective:       Patient ID: Siobhan Ko is a 53 y.o. female.    Chief Complaint: 6 month follow up      Routine        Review of Systems   Constitutional: Negative.    Respiratory: Negative.     Cardiovascular: Negative.    Gastrointestinal: Negative.    Psychiatric/Behavioral: Negative.          Anxiety: reports medication no longer working well, on "edge", desires to change medication           Objective:      /76 (BP Location: Left arm, Patient Position: Sitting, BP Method: Large (Manual))   Pulse 89   Temp 97.5 °F (36.4 °C)   Resp 18   Ht 5' 2" (1.575 m)   Wt 83.5 kg (184 lb)   SpO2 98%   BMI 33.65 kg/m²    Physical Exam  Constitutional:       Appearance: Normal appearance.   Cardiovascular:      Rate and Rhythm: Normal rate and regular rhythm.      Heart sounds: Normal heart sounds.   Pulmonary:      Effort: Pulmonary effort is normal.      Breath sounds: Normal breath sounds.   Neurological:      Mental Status: She is alert.   Psychiatric:         Mood and Affect: Mood normal.         Behavior: Behavior normal.         Thought Content: Thought content normal.         Judgment: Judgment normal.               Assessment:       Problem List Items Addressed This Visit          Psychiatric    Anxiety - Primary    Relevant Medications    EScitalopram oxalate (LEXAPRO) 10 MG tablet          Plan:   1. Anxiety  -     EScitalopram oxalate (LEXAPRO) 10 MG tablet; Take 1 tablet (10 mg total) by mouth once daily.  Dispense: 30 tablet; Refill: 1  Stop Effexor  Start Lexapro 10 mg daily  Relaxation technique  Monitor  Return to clinic with any concerns               "

## 2023-12-27 DIAGNOSIS — F41.9 ANXIETY: ICD-10-CM

## 2023-12-27 RX ORDER — ESCITALOPRAM OXALATE 10 MG/1
10 TABLET ORAL
Qty: 90 TABLET | Refills: 1 | Status: SHIPPED | OUTPATIENT
Start: 2023-12-27 | End: 2024-01-18 | Stop reason: SDUPTHER

## 2024-01-02 ENCOUNTER — PROCEDURE VISIT (OUTPATIENT)
Dept: NEUROLOGY | Facility: CLINIC | Age: 54
End: 2024-01-02
Payer: COMMERCIAL

## 2024-01-02 DIAGNOSIS — G60.0 CMT (CHARCOT-MARIE-TOOTH DISEASE): Primary | ICD-10-CM

## 2024-01-02 DIAGNOSIS — R29.898 ANKLE WEAKNESS: ICD-10-CM

## 2024-01-02 DIAGNOSIS — G60.9 INHERITED POLYNEUROPATHY: ICD-10-CM

## 2024-01-02 PROCEDURE — 95912 NRV CNDJ TEST 11-12 STUDIES: CPT | Mod: S$GLB,,, | Performed by: SPECIALIST

## 2024-01-02 PROCEDURE — 95885 MUSC TST DONE W/NERV TST LIM: CPT | Mod: S$GLB,,, | Performed by: SPECIALIST

## 2024-01-02 NOTE — PROCEDURES
Nerve conductions / EMG performed and report scanned in chart. 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

## 2024-01-03 ENCOUNTER — TELEPHONE (OUTPATIENT)
Dept: NEUROLOGY | Facility: CLINIC | Age: 54
End: 2024-01-03
Payer: COMMERCIAL

## 2024-01-03 NOTE — TELEPHONE ENCOUNTER
----- Message from Oniel Hernandez MD sent at 1/2/2024  4:54 PM CST -----  I want her to get [facility] send out gene blood test for cmt neuropathy to be drawn in [place] please    I placed order     wanted you to know / thanks for your help

## 2024-01-05 ENCOUNTER — LAB VISIT (OUTPATIENT)
Dept: LAB | Facility: HOSPITAL | Age: 54
End: 2024-01-05
Attending: SPECIALIST
Payer: COMMERCIAL

## 2024-01-05 DIAGNOSIS — G60.9 INHERITED POLYNEUROPATHY: ICD-10-CM

## 2024-01-05 DIAGNOSIS — G60.0 CMT (CHARCOT-MARIE-TOOTH DISEASE): ICD-10-CM

## 2024-01-05 DIAGNOSIS — R29.898 ANKLE WEAKNESS: ICD-10-CM

## 2024-01-05 PROCEDURE — 81448 HRDTRY PERPH NEURPHY PANEL: CPT

## 2024-01-17 LAB — MAYO GENERIC ORDERABLE RESULT: NORMAL

## 2024-01-18 ENCOUNTER — OFFICE VISIT (OUTPATIENT)
Dept: FAMILY MEDICINE | Facility: CLINIC | Age: 54
End: 2024-01-18
Payer: COMMERCIAL

## 2024-01-18 VITALS
SYSTOLIC BLOOD PRESSURE: 110 MMHG | BODY MASS INDEX: 34.41 KG/M2 | HEIGHT: 62 IN | OXYGEN SATURATION: 98 % | DIASTOLIC BLOOD PRESSURE: 74 MMHG | WEIGHT: 187 LBS | HEART RATE: 81 BPM | TEMPERATURE: 99 F | RESPIRATION RATE: 18 BRPM

## 2024-01-18 DIAGNOSIS — F41.9 ANXIETY: Primary | ICD-10-CM

## 2024-01-18 PROCEDURE — 1160F RVW MEDS BY RX/DR IN RCRD: CPT | Mod: CPTII,,, | Performed by: FAMILY MEDICINE

## 2024-01-18 PROCEDURE — 3078F DIAST BP <80 MM HG: CPT | Mod: CPTII,,, | Performed by: FAMILY MEDICINE

## 2024-01-18 PROCEDURE — 3074F SYST BP LT 130 MM HG: CPT | Mod: CPTII,,, | Performed by: FAMILY MEDICINE

## 2024-01-18 PROCEDURE — 1159F MED LIST DOCD IN RCRD: CPT | Mod: CPTII,,, | Performed by: FAMILY MEDICINE

## 2024-01-18 PROCEDURE — 3008F BODY MASS INDEX DOCD: CPT | Mod: CPTII,,, | Performed by: FAMILY MEDICINE

## 2024-01-18 PROCEDURE — 99213 OFFICE O/P EST LOW 20 MIN: CPT | Mod: ,,, | Performed by: FAMILY MEDICINE

## 2024-01-18 RX ORDER — ESTRADIOL 0.5 MG/1
0.5 TABLET ORAL
COMMUNITY
Start: 2023-09-22 | End: 2024-05-31

## 2024-01-18 RX ORDER — ESCITALOPRAM OXALATE 10 MG/1
10 TABLET ORAL DAILY
Qty: 30 TABLET | Refills: 3 | Status: SHIPPED | OUTPATIENT
Start: 2024-01-18 | End: 2024-05-31 | Stop reason: SDUPTHER

## 2024-01-18 NOTE — PROGRESS NOTES
"Subjective:       Patient ID: Siobhan Ko is a 53 y.o. female.    Chief Complaint: 1 month       Follow-up        Review of Systems   Constitutional: Negative.    Respiratory: Negative.     Cardiovascular: Negative.    Gastrointestinal: Negative.    Psychiatric/Behavioral: Negative.          Anxiety: tolerating medication, medication working well, patient without any complaints             Objective:      /74 (BP Location: Left arm, Patient Position: Sitting, BP Method: Large (Manual))   Pulse 81   Temp 98.5 °F (36.9 °C)   Resp 18   Ht 5' 2" (1.575 m)   Wt 84.8 kg (187 lb)   SpO2 98%   BMI 34.20 kg/m²    Physical Exam  Constitutional:       Appearance: Normal appearance.   Cardiovascular:      Rate and Rhythm: Normal rate and regular rhythm.      Heart sounds: Normal heart sounds.   Pulmonary:      Effort: Pulmonary effort is normal.      Breath sounds: Normal breath sounds.   Neurological:      Mental Status: She is alert.   Psychiatric:         Mood and Affect: Mood normal.         Behavior: Behavior normal.         Thought Content: Thought content normal.         Judgment: Judgment normal.               Assessment:       Problem List Items Addressed This Visit          Psychiatric    Anxiety - Primary    Relevant Medications    EScitalopram oxalate (LEXAPRO) 10 MG tablet          Plan:   1. Anxiety  -     EScitalopram oxalate (LEXAPRO) 10 MG tablet; Take 1 tablet (10 mg total) by mouth once daily.  Dispense: 30 tablet; Refill: 3  Controlled  Continue current Rx medication  Return to clinic with any concerns               "

## 2024-01-23 DIAGNOSIS — E03.9 HYPOTHYROIDISM, UNSPECIFIED TYPE: Primary | ICD-10-CM

## 2024-01-23 RX ORDER — LEVOTHYROXINE SODIUM 100 UG/1
100 TABLET ORAL
Qty: 30 TABLET | Refills: 3 | Status: SHIPPED | OUTPATIENT
Start: 2024-01-23 | End: 2024-04-22

## 2024-04-17 DIAGNOSIS — Z11.4 ENCOUNTER FOR HIV (HUMAN IMMUNODEFICIENCY VIRUS) TEST: Primary | ICD-10-CM

## 2024-04-17 DIAGNOSIS — Z00.00 WELLNESS EXAMINATION: ICD-10-CM

## 2024-04-17 DIAGNOSIS — Z11.59 NEED FOR HEPATITIS C SCREENING TEST: ICD-10-CM

## 2024-04-17 DIAGNOSIS — E03.9 HYPOTHYROIDISM, UNSPECIFIED TYPE: ICD-10-CM

## 2024-04-17 DIAGNOSIS — Z13.6 SCREENING FOR ISCHEMIC HEART DISEASE: ICD-10-CM

## 2024-04-20 DIAGNOSIS — E03.9 HYPOTHYROIDISM, UNSPECIFIED TYPE: ICD-10-CM

## 2024-04-22 RX ORDER — LEVOTHYROXINE SODIUM 100 UG/1
100 TABLET ORAL
Qty: 90 TABLET | Refills: 1 | Status: SHIPPED | OUTPATIENT
Start: 2024-04-22 | End: 2024-05-31

## 2024-05-31 ENCOUNTER — OFFICE VISIT (OUTPATIENT)
Dept: FAMILY MEDICINE | Facility: CLINIC | Age: 54
End: 2024-05-31
Payer: COMMERCIAL

## 2024-05-31 ENCOUNTER — DOCUMENTATION ONLY (OUTPATIENT)
Facility: CLINIC | Age: 54
End: 2024-05-31
Payer: COMMERCIAL

## 2024-05-31 VITALS
HEIGHT: 62 IN | OXYGEN SATURATION: 98 % | BODY MASS INDEX: 33.49 KG/M2 | SYSTOLIC BLOOD PRESSURE: 118 MMHG | RESPIRATION RATE: 18 BRPM | HEART RATE: 67 BPM | WEIGHT: 182 LBS | TEMPERATURE: 97 F | DIASTOLIC BLOOD PRESSURE: 68 MMHG

## 2024-05-31 DIAGNOSIS — Z00.00 WELLNESS EXAMINATION: Primary | ICD-10-CM

## 2024-05-31 DIAGNOSIS — F41.9 ANXIETY: ICD-10-CM

## 2024-05-31 DIAGNOSIS — Z12.31 SCREENING MAMMOGRAM, ENCOUNTER FOR: ICD-10-CM

## 2024-05-31 PROCEDURE — 3074F SYST BP LT 130 MM HG: CPT | Mod: CPTII,,, | Performed by: FAMILY MEDICINE

## 2024-05-31 PROCEDURE — 1159F MED LIST DOCD IN RCRD: CPT | Mod: CPTII,,, | Performed by: FAMILY MEDICINE

## 2024-05-31 PROCEDURE — 99396 PREV VISIT EST AGE 40-64: CPT | Mod: ,,, | Performed by: FAMILY MEDICINE

## 2024-05-31 PROCEDURE — 3008F BODY MASS INDEX DOCD: CPT | Mod: CPTII,,, | Performed by: FAMILY MEDICINE

## 2024-05-31 PROCEDURE — 3078F DIAST BP <80 MM HG: CPT | Mod: CPTII,,, | Performed by: FAMILY MEDICINE

## 2024-05-31 PROCEDURE — 1160F RVW MEDS BY RX/DR IN RCRD: CPT | Mod: CPTII,,, | Performed by: FAMILY MEDICINE

## 2024-05-31 RX ORDER — SPIRONOLACTONE 100 MG/1
100 TABLET, FILM COATED ORAL
COMMUNITY
Start: 2024-05-21

## 2024-05-31 RX ORDER — ESCITALOPRAM OXALATE 20 MG/1
20 TABLET ORAL DAILY
Qty: 30 TABLET | Refills: 3 | Status: SHIPPED | OUTPATIENT
Start: 2024-05-31 | End: 2024-09-28

## 2024-05-31 NOTE — PROGRESS NOTES
"Subjective:      Patient ID: Siobhan Ko is a 53 y.o. female.    Chief Complaint: wellness      Wellness        Review of Systems   Constitutional: Negative.    Respiratory: Negative.     Cardiovascular: Negative.    Gastrointestinal: Negative.    Genitourinary:         Annual sean/pap with Dr Gomes on 8/2024   Psychiatric/Behavioral: Negative.          Anxiety: reports medication working but continues to have days of anxiety         Objective:     /68 (BP Location: Left arm, Patient Position: Sitting, BP Method: Large (Manual))   Pulse 67   Temp 97.4 °F (36.3 °C)   Resp 18   Ht 5' 2" (1.575 m)   Wt 82.6 kg (182 lb)   SpO2 98%   BMI 33.29 kg/m²    Physical Exam  Constitutional:       Appearance: Normal appearance.   Cardiovascular:      Rate and Rhythm: Normal rate and regular rhythm.   Pulmonary:      Effort: Pulmonary effort is normal.      Breath sounds: Normal breath sounds.   Abdominal:      General: Abdomen is flat. Bowel sounds are normal.      Palpations: Abdomen is soft.   Neurological:      Mental Status: She is alert.   Psychiatric:         Mood and Affect: Mood normal.         Behavior: Behavior normal.         Thought Content: Thought content normal.         Judgment: Judgment normal.             Assessment:     Problem List Items Addressed This Visit          Psychiatric    Anxiety    Relevant Medications    EScitalopram oxalate (LEXAPRO) 20 MG tablet       Endocrine    BMI 33.0-33.9,adult    Current Assessment & Plan     Reduced calorie diet modification  Frequent self weighing   Exercise/lifestyle modification          Other Visit Diagnoses       Wellness examination    -  Primary    Screening mammogram, encounter for                 Plan:   1. Wellness examination  Lab work discussed with patient  Continue current medication  Continue diet/exercise  Return to clinic with any concerns    2. BMI 33.0-33.9,adult  Assessment & Plan:  Reduced calorie diet modification  Frequent " self weighing   Exercise/lifestyle modification    3. Anxiety  -     EScitalopram oxalate (LEXAPRO) 20 MG tablet; Take 1 tablet (20 mg total) by mouth once daily.  Dispense: 30 tablet; Refill: 3  Increase Lexapro to 20 mg q.day   Relaxation technique   Monitor   Return to clinic with any concerns     4. Screening mammogram, encounter for  Keep scheduled appointment for annual Bryce/Pap with Dr. Gomes on 08/2024

## 2024-07-08 ENCOUNTER — OFFICE VISIT (OUTPATIENT)
Dept: FAMILY MEDICINE | Facility: CLINIC | Age: 54
End: 2024-07-08
Payer: COMMERCIAL

## 2024-07-08 VITALS
SYSTOLIC BLOOD PRESSURE: 118 MMHG | TEMPERATURE: 97 F | BODY MASS INDEX: 33.31 KG/M2 | OXYGEN SATURATION: 97 % | WEIGHT: 181 LBS | HEART RATE: 84 BPM | HEIGHT: 62 IN | RESPIRATION RATE: 16 BRPM | DIASTOLIC BLOOD PRESSURE: 78 MMHG

## 2024-07-08 DIAGNOSIS — F41.9 ANXIETY: Primary | ICD-10-CM

## 2024-07-08 PROCEDURE — 99214 OFFICE O/P EST MOD 30 MIN: CPT | Mod: ,,, | Performed by: FAMILY MEDICINE

## 2024-07-08 PROCEDURE — 3074F SYST BP LT 130 MM HG: CPT | Mod: CPTII,,, | Performed by: FAMILY MEDICINE

## 2024-07-08 PROCEDURE — 3008F BODY MASS INDEX DOCD: CPT | Mod: CPTII,,, | Performed by: FAMILY MEDICINE

## 2024-07-08 PROCEDURE — 3078F DIAST BP <80 MM HG: CPT | Mod: CPTII,,, | Performed by: FAMILY MEDICINE

## 2024-07-08 PROCEDURE — 1159F MED LIST DOCD IN RCRD: CPT | Mod: CPTII,,, | Performed by: FAMILY MEDICINE

## 2024-07-08 PROCEDURE — 1160F RVW MEDS BY RX/DR IN RCRD: CPT | Mod: CPTII,,, | Performed by: FAMILY MEDICINE

## 2024-07-08 RX ORDER — VENLAFAXINE HYDROCHLORIDE 150 MG/1
150 CAPSULE, EXTENDED RELEASE ORAL DAILY
Qty: 30 CAPSULE | Refills: 1 | Status: SHIPPED | OUTPATIENT
Start: 2024-07-08 | End: 2024-09-06

## 2024-07-08 RX ORDER — ESTRADIOL 1 MG/1
TABLET ORAL
COMMUNITY
Start: 2024-05-23

## 2024-07-08 RX ORDER — LEVOTHYROXINE SODIUM 100 UG/1
100 TABLET ORAL
COMMUNITY

## 2024-07-08 RX ORDER — CYANOCOBALAMIN 1000 UG/ML
1000 INJECTION, SOLUTION INTRAMUSCULAR; SUBCUTANEOUS
COMMUNITY
Start: 2024-05-21

## 2024-07-08 RX ORDER — PROGESTERONE 100 MG/1
100 CAPSULE ORAL NIGHTLY
COMMUNITY
Start: 2024-05-21

## 2024-07-08 NOTE — PROGRESS NOTES
"Subjective:      Patient ID: Siohban Ko is a 53 y.o. female.    Chief Complaint: 1 month f/u      Follow-up        Review of Systems   Constitutional: Negative.    Respiratory: Negative.     Cardiovascular: Negative.    Gastrointestinal: Negative.    Psychiatric/Behavioral: Negative.          Anxiety: reports medication not working, desires to change back to effexor         Objective:     /78   Pulse 84   Temp 97.2 °F (36.2 °C) (Temporal)   Resp 16   Ht 5' 1.81" (1.57 m)   Wt 82.1 kg (181 lb)   SpO2 97%   BMI 33.31 kg/m²    Physical Exam  Constitutional:       Appearance: Normal appearance.   Cardiovascular:      Rate and Rhythm: Normal rate and regular rhythm.      Heart sounds: Normal heart sounds.   Pulmonary:      Effort: Pulmonary effort is normal.      Breath sounds: Normal breath sounds.   Neurological:      Mental Status: She is alert.   Psychiatric:         Mood and Affect: Mood normal.         Behavior: Behavior normal.         Thought Content: Thought content normal.         Judgment: Judgment normal.             Assessment:     Problem List Items Addressed This Visit          Psychiatric    Anxiety - Primary    Relevant Medications    venlafaxine (EFFEXOR-XR) 150 MG Cp24        Plan:   1. Anxiety  -     venlafaxine (EFFEXOR-XR) 150 MG Cp24; Take 1 capsule (150 mg total) by mouth once daily.  Dispense: 30 capsule; Refill: 1  Stop Lexapro   Start Effexor  mg q.day  Relaxation technique   Monitor   Return to clinic with any concerns     "

## 2024-07-08 NOTE — PROGRESS NOTES
Family Medicine    Patient ID: [id number]     Chief Complaint: No chief complaint on file.      HPI:     Siobhan Ko is a 53 y.o. female here today for a follow up.     Past Medical History:   Diagnosis Date    Anxiety disorder, unspecified     Appendicitis 1992    Bone spur of foot     left    Broken leg 2020    left    Hypothyroidism, unspecified     Obesity     Postmenopausal bleeding     Sleep apnea     uses CPAP        Past Surgical History:   Procedure Laterality Date    APPENDECTOMY  1992    COLONOSCOPY      FOOT SURGERY Left     from bone spur    FRACTURE SURGERY Left 2020    leg    HYSTEROSCOPIC POLYPECTOMY OF UTERUS N/A 09/20/2022    Procedure: POLYPECTOMY, UTERUS, HYSTEROSCOPIC;  Surgeon: Song Gomes MD;  Location: [facility];  Service: OB/GYN;  Laterality: N/A;    HYSTEROSCOPY WITH DILATION AND CURETTAGE OF UTERUS N/A 09/20/2022    Procedure: HYSTEROSCOPY, WITH DILATION AND CURETTAGE OF UTERUS;  Surgeon: Song Gomes MD;  Location: [facility] OR;  Service: OB/GYN;  Laterality: N/A;    LASIK Bilateral     ROBOT-ASSISTED LAPAROSCOPIC ABDOMINAL HYSTERECTOMY USING DA LISSY XI N/A 11/15/2023    Procedure: XI ROBOTIC HYSTERECTOMY;  Surgeon: Song Gomes MD;  Location: [facility] OR;  Service: OB/GYN;  Laterality: N/A;  RALH / BSO    TUBAL LIGATION  1999        Social History     Tobacco Use    Smoking status: Former     Types: Cigarettes    Smokeless tobacco: Never   Substance and Sexual Activity    Alcohol use: Yes     Alcohol/week: 1.0 standard drink of alcohol     Types: 1 Cans of beer per week     Comment: weekends    Drug use: Never    Sexual activity: Yes     Partners: Male        Current Outpatient Medications   Medication Instructions    EScitalopram oxalate (LEXAPRO) 20 mg, Oral, Daily    liothyronine (CYTOMEL) 5 mcg, Oral, Daily    spironolactone (ALDACTONE) 100 mg, Oral       Review of patient's allergies indicates:  No Known Allergies     Patient Care Team:  Nathan Crawford MD as  "PCP - General (Family Medicine)  Yandy Drummond, DAXA (Internal Medicine)  Song Gomes MD as Consulting Physician (Obstetrics and Gynecology)  Ta Justice MD as Consulting Physician (Gastroenterology)     Subjective:     Review of Systems    12 point review of systems conducted, negative except as stated in the history of present illness. See HPI for details.    Objective:     There were no vitals taken for this visit.    Physical Exam    Labs Reviewed:     Chemistry:  Lab Results   Component Value Date     11/10/2023    K 4.3 11/10/2023    BUN 11.3 11/10/2023    CREATININE 0.66 11/10/2023    EGFRNORACEVR >60 11/10/2023    GLUCOSE 84 11/10/2023    CALCIUM 9.2 11/10/2023    ALKPHOS 78 11/10/2023    LABPROT 6.7 11/10/2023    ALBUMIN 4.0 11/10/2023    BILIDIR 0.2 03/05/2021    IBILI 0.20 03/05/2021    AST 18 11/10/2023    ALT 22 11/10/2023    XIMIJKYW89FK 35.4 03/14/2023    TSH 0.021 (L) 11/10/2023    UXTLKD9MCLA 1.09 11/10/2023        Lab Results   Component Value Date    HGBA1C 5.0 11/10/2023        Hematology:  Lab Results   Component Value Date    WBC 14.02 (H) 11/16/2023    HGB 12.7 11/16/2023    HCT 37.8 11/16/2023     11/16/2023       Lipid Panel:  Lab Results   Component Value Date    CHOL 173 03/14/2023    HDL 38 03/14/2023    .00 03/14/2023    TRIG 59 03/14/2023    TOTALCHOLEST 5 03/14/2023        Urine:  No results found for: "COLORUA", "APPEARANCEUA", "SGUA", "PHUA", "PROTEINUA", "GLUCOSEUA", "KETONESUA", "BLOODUA", "NITRITESUA", "LEUKOCYTESUR", "RBCUA", "WBCUA", "BACTERIA", "SQEPUA", "HYALINECASTS", "CREATRANDUR", "PROTEINURINE", "UPROTCREA"     Assessment:     No diagnosis found.     Plan:     {There are no diagnoses linked to this encounter. (Refresh or delete this SmartLink)}     No follow-ups on file. In addition to their scheduled follow up, the patient has also been instructed to follow up on as needed basis.     Future Appointments   Date Time Provider Department " [place]   7/8/2024  2:30 PM Nathan Crawford MD [facility] [place] [facility]    6/12/2025  9:30 AM Nathan Crawford MD [facility] [place] [facility]         Martha Willson, DAXA

## 2024-07-31 DIAGNOSIS — F41.9 ANXIETY: ICD-10-CM

## 2024-07-31 RX ORDER — VENLAFAXINE HYDROCHLORIDE 150 MG/1
150 CAPSULE, EXTENDED RELEASE ORAL DAILY
Qty: 90 CAPSULE | Refills: 1 | Status: SHIPPED | OUTPATIENT
Start: 2024-07-31 | End: 2025-01-27

## 2024-08-05 ENCOUNTER — OFFICE VISIT (OUTPATIENT)
Dept: FAMILY MEDICINE | Facility: CLINIC | Age: 54
End: 2024-08-05
Payer: COMMERCIAL

## 2024-08-05 VITALS
SYSTOLIC BLOOD PRESSURE: 118 MMHG | OXYGEN SATURATION: 98 % | RESPIRATION RATE: 18 BRPM | HEART RATE: 85 BPM | WEIGHT: 172 LBS | HEIGHT: 62 IN | TEMPERATURE: 97 F | DIASTOLIC BLOOD PRESSURE: 78 MMHG | BODY MASS INDEX: 31.65 KG/M2

## 2024-08-05 DIAGNOSIS — F41.9 ANXIETY: Primary | ICD-10-CM

## 2024-08-05 PROCEDURE — 3074F SYST BP LT 130 MM HG: CPT | Mod: CPTII,,, | Performed by: FAMILY MEDICINE

## 2024-08-05 PROCEDURE — 1160F RVW MEDS BY RX/DR IN RCRD: CPT | Mod: CPTII,,, | Performed by: FAMILY MEDICINE

## 2024-08-05 PROCEDURE — 3078F DIAST BP <80 MM HG: CPT | Mod: CPTII,,, | Performed by: FAMILY MEDICINE

## 2024-08-05 PROCEDURE — 99213 OFFICE O/P EST LOW 20 MIN: CPT | Mod: ,,, | Performed by: FAMILY MEDICINE

## 2024-08-05 PROCEDURE — 1159F MED LIST DOCD IN RCRD: CPT | Mod: CPTII,,, | Performed by: FAMILY MEDICINE

## 2024-08-05 PROCEDURE — 3008F BODY MASS INDEX DOCD: CPT | Mod: CPTII,,, | Performed by: FAMILY MEDICINE

## 2024-08-05 RX ORDER — VENLAFAXINE HYDROCHLORIDE 150 MG/1
150 CAPSULE, EXTENDED RELEASE ORAL DAILY
Qty: 90 CAPSULE | Refills: 1 | Status: SHIPPED | OUTPATIENT
Start: 2024-08-05 | End: 2025-02-01

## 2024-08-08 ENCOUNTER — HOSPITAL ENCOUNTER (OUTPATIENT)
Dept: RADIOLOGY | Facility: HOSPITAL | Age: 54
Discharge: HOME | End: 2024-08-08
Attending: OBSTETRICS & GYNECOLOGY
Payer: COMMERCIAL

## 2024-08-08 DIAGNOSIS — Z12.31 ENCOUNTER FOR SCREENING MAMMOGRAM FOR BREAST CANCER: ICD-10-CM

## 2024-08-08 PROCEDURE — 77063 BREAST TOMOSYNTHESIS BI: CPT | Mod: TC

## 2024-08-08 PROCEDURE — 77067 SCR MAMMO BI INCL CAD: CPT | Mod: TC

## 2024-08-17 ENCOUNTER — OFFICE VISIT (OUTPATIENT)
Dept: URGENT CARE | Facility: CLINIC | Age: 54
End: 2024-08-17
Payer: COMMERCIAL

## 2024-08-17 VITALS
SYSTOLIC BLOOD PRESSURE: 133 MMHG | TEMPERATURE: 98 F | OXYGEN SATURATION: 98 % | DIASTOLIC BLOOD PRESSURE: 86 MMHG | WEIGHT: 171 LBS | BODY MASS INDEX: 31.47 KG/M2 | HEART RATE: 76 BPM | RESPIRATION RATE: 19 BRPM | HEIGHT: 62 IN

## 2024-08-17 DIAGNOSIS — J32.9 SINUSITIS, UNSPECIFIED CHRONICITY, UNSPECIFIED LOCATION: Primary | ICD-10-CM

## 2024-08-17 PROCEDURE — 96372 THER/PROPH/DIAG INJ SC/IM: CPT | Mod: ,,,

## 2024-08-17 PROCEDURE — 99213 OFFICE O/P EST LOW 20 MIN: CPT | Mod: 25,,,

## 2024-08-17 RX ORDER — BETAMETHASONE SODIUM PHOSPHATE AND BETAMETHASONE ACETATE 3; 3 MG/ML; MG/ML
9 INJECTION, SUSPENSION INTRA-ARTICULAR; INTRALESIONAL; INTRAMUSCULAR; SOFT TISSUE
Status: COMPLETED | OUTPATIENT
Start: 2024-08-17 | End: 2024-08-17

## 2024-08-17 RX ORDER — MEDROXYPROGESTERONE ACETATE 2.5 MG/1
2.5 TABLET ORAL
COMMUNITY
Start: 2024-05-23

## 2024-08-17 RX ORDER — AMOXICILLIN AND CLAVULANATE POTASSIUM 875; 125 MG/1; MG/1
1 TABLET, FILM COATED ORAL EVERY 12 HOURS
Qty: 14 TABLET | Refills: 0 | Status: SHIPPED | OUTPATIENT
Start: 2024-08-17 | End: 2024-08-24

## 2024-08-17 RX ADMIN — BETAMETHASONE SODIUM PHOSPHATE AND BETAMETHASONE ACETATE 9 MG: 3; 3 INJECTION, SUSPENSION INTRA-ARTICULAR; INTRALESIONAL; INTRAMUSCULAR; SOFT TISSUE at 01:08

## 2024-08-17 NOTE — PATIENT INSTRUCTIONS
May begin oral antibiotics in a few days if symptoms fail to improve despite steroid injection.  Drink plenty of fluids. Get plenty of rest.   Claritin, Zyrtec, or other over-the-counter antihistamine for runny nose, postnasal drip, nasal congestion.  Nasal spray such as Nasacort or Flonase for congestion.  Over-the-counter cough medication as needed and as directed.  Over-the-counter decongestants such as Sudafed, phenylephrine or pseudoephedrine.  Avoid these if you have a history of high blood pressure.  Warm saltwater gargles for sore throat.  Warm water with honey to help coat the throat.  Throat lozenges.  Chloraseptic spray for worsening sore throat.  Tylenol or ibuprofen as needed for sore throat and fever.  May alternate every 3 hours    Call or return to clinic as needed   Go to the ER with any significant change or worsening of symptoms.   Follow up with your primary care doctor.

## 2024-08-17 NOTE — PROGRESS NOTES
"Subjective:      Patient ID: Siobhan Ko is a 53 y.o. female.    Vitals:  height is 5' 2" (1.575 m) and weight is 77.6 kg (171 lb). Her oral temperature is 97.6 °F (36.4 °C). Her blood pressure is 133/86 and her pulse is 76. Her respiration is 19 and oxygen saturation is 98%.     Chief Complaint: Nasal Congestion     Patient is a 53 y.o. female who presents to urgent care with complaints of nasal congestion, sneezing, rhinorrhea, fullness sensation in ears, sinus pressure, PND x 2 weeks. Patient denies fever, cough, neck stiffness, rash, GI symptoms, labored breathing. She states she recently discovered her office had mold in it around the same time her symptoms started.  She reports she now developed thick colored postnasal drip, mucous.     ROS   Objective:     Physical Exam   Constitutional: She is oriented to person, place, and time. She appears well-developed. She is cooperative.  Non-toxic appearance. She does not appear ill. No distress.   HENT:   Head: Normocephalic and atraumatic.   Ears:   Right Ear: Hearing, external ear and ear canal normal. Tympanic membrane is bulging. A middle ear effusion is present.   Left Ear: Hearing, external ear and ear canal normal. Tympanic membrane is bulging. A middle ear effusion is present.   Nose: Rhinorrhea present. No mucosal edema or nasal deformity. No epistaxis. Right sinus exhibits no maxillary sinus tenderness and no frontal sinus tenderness. Left sinus exhibits no maxillary sinus tenderness and no frontal sinus tenderness.   Mouth/Throat: Uvula is midline, oropharynx is clear and moist and mucous membranes are normal. Mucous membranes are moist. No trismus in the jaw. Normal dentition. No uvula swelling. No oropharyngeal exudate, posterior oropharyngeal edema or posterior oropharyngeal erythema.   Eyes: Conjunctivae and lids are normal. No scleral icterus.   Neck: Trachea normal and phonation normal. Neck supple. No edema present. No erythema present. No " neck rigidity present.   Cardiovascular: Normal rate, regular rhythm, normal heart sounds and normal pulses.   Pulmonary/Chest: Effort normal and breath sounds normal. No respiratory distress. She has no decreased breath sounds. She has no wheezes. She has no rhonchi. She has no rales.   Abdominal: Normal appearance.   Musculoskeletal: Normal range of motion.         General: No deformity. Normal range of motion.   Lymphadenopathy:     She has no cervical adenopathy.   Neurological: She is alert and oriented to person, place, and time. She exhibits normal muscle tone. Coordination normal.   Skin: Skin is warm, dry, intact, not diaphoretic and not pale.   Psychiatric: Her speech is normal and behavior is normal. Judgment and thought content normal.   Nursing note and vitals reviewed.      Assessment:     1. Sinusitis, unspecified chronicity, unspecified location        Plan:       Sinusitis, unspecified chronicity, unspecified location  -     betamethasone acetate-betamethasone sodium phosphate injection 9 mg  -     amoxicillin-clavulanate 875-125mg (AUGMENTIN) 875-125 mg per tablet; Take 1 tablet by mouth every 12 (twelve) hours. for 7 days  Dispense: 14 tablet; Refill: 0           May begin oral antibiotics in a few days if symptoms fail to improve despite steroid injection.   Drink plenty of fluids. Get plenty of rest.   Claritin, Zyrtec, or other over-the-counter antihistamine for runny nose, postnasal drip, nasal congestion.  Nasal spray such as Nasacort or Flonase for congestion.  Over-the-counter cough medication as needed and as directed.  Over-the-counter decongestants such as Sudafed, phenylephrine or pseudoephedrine.  Avoid these if you have a history of high blood pressure.  Warm saltwater gargles for sore throat.  Warm water with honey to help coat the throat.  Throat lozenges.  Chloraseptic spray for worsening sore throat.  Tylenol or ibuprofen as needed for sore throat and fever.  May alternate every 3  hours    Call or return to clinic as needed   Go to the ER with any significant change or worsening of symptoms.   Follow up with your primary care doctor.

## 2024-08-30 ENCOUNTER — OFFICE VISIT (OUTPATIENT)
Dept: URGENT CARE | Facility: CLINIC | Age: 54
End: 2024-08-30
Payer: COMMERCIAL

## 2024-08-30 VITALS
BODY MASS INDEX: 31.28 KG/M2 | SYSTOLIC BLOOD PRESSURE: 111 MMHG | HEIGHT: 62 IN | DIASTOLIC BLOOD PRESSURE: 75 MMHG | WEIGHT: 170 LBS | OXYGEN SATURATION: 100 % | TEMPERATURE: 98 F | HEART RATE: 90 BPM

## 2024-08-30 DIAGNOSIS — J32.9 SINUSITIS, UNSPECIFIED CHRONICITY, UNSPECIFIED LOCATION: Primary | ICD-10-CM

## 2024-08-30 RX ORDER — PREDNISONE 20 MG/1
20 TABLET ORAL 2 TIMES DAILY
Qty: 10 TABLET | Refills: 0 | Status: SHIPPED | OUTPATIENT
Start: 2024-08-30 | End: 2024-09-04

## 2024-08-30 NOTE — PROGRESS NOTES
"Subjective:      Patient ID: Siobhan Ko is a 53 y.o. female.    Vitals:  height is 5' 2" (1.575 m) and weight is 77.1 kg (170 lb). Her temperature is 97.6 °F (36.4 °C). Her blood pressure is 111/75 and her pulse is 90. Her respiration is 20 (pended) and oxygen saturation is 100%.     Chief Complaint: Sinus Problem     Patient is a 53 y.o. female who presents to urgent care with complaints of nasal congestion, rhinorrhea, sinus pressure headache, sneezing, and bilateral ear pain/fullness since 08/03/24. Pt states this is the same symptoms she had when she came on 8/17. Alleviating factors include Augmentin and Celestone injection with moderate relief until the last week.  She is using Xyzal nightly.  Patient denies fever, body aches, sore throat, chills, vomiting, or diarrhea.  She denies unilateral sinus pressure, persistent green nasal drainage/mucus, fever.  Pt states that her work may have mold where she works.  She has seen ENT in the past primarily for her CPAP.      ROS   Objective:     Physical Exam   Constitutional: She is oriented to person, place, and time. She appears well-developed. She is cooperative.  Non-toxic appearance. She does not appear ill. No distress.   HENT:   Head: Normocephalic and atraumatic.   Ears:   Right Ear: Hearing, tympanic membrane, external ear and ear canal normal.   Left Ear: Hearing, tympanic membrane, external ear and ear canal normal.      Comments: Bilateral TM: Clear effusions  Nose: Rhinorrhea present. No mucosal edema or nasal deformity. No epistaxis. Right sinus exhibits no maxillary sinus tenderness and no frontal sinus tenderness. Left sinus exhibits no maxillary sinus tenderness and no frontal sinus tenderness.      Comments: White discolored nasal mucus, erythema irritation nares mild congestion  Mouth/Throat: Uvula is midline, oropharynx is clear and moist and mucous membranes are normal. Mucous membranes are moist. No trismus in the jaw. Normal " dentition. No uvula swelling. No oropharyngeal exudate, posterior oropharyngeal edema or posterior oropharyngeal erythema.   Eyes: Conjunctivae and lids are normal. No scleral icterus.   Neck: Trachea normal and phonation normal. Neck supple. No edema present. No erythema present. No neck rigidity present.   Cardiovascular: Normal rate, regular rhythm, normal heart sounds and normal pulses.   Pulmonary/Chest: Effort normal and breath sounds normal. No respiratory distress. She has no decreased breath sounds. She has no wheezes. She has no rhonchi. She has no rales.   Abdominal: Normal appearance.   Musculoskeletal: Normal range of motion.         General: No deformity. Normal range of motion.   Lymphadenopathy:     She has no cervical adenopathy.   Neurological: She is alert and oriented to person, place, and time. She exhibits normal muscle tone. Coordination normal.   Skin: Skin is warm, dry, intact, not diaphoretic and not pale.   Psychiatric: Her speech is normal and behavior is normal. Judgment and thought content normal.   Nursing note and vitals reviewed.      Assessment:     1. Sinusitis, unspecified chronicity, unspecified location        Plan:       Sinusitis, unspecified chronicity, unspecified location  -     predniSONE (DELTASONE) 20 MG tablet; Take 1 tablet (20 mg total) by mouth 2 (two) times daily. for 5 days  Dispense: 10 tablet; Refill: 0      As she is not having fever, unilateral sinus symptoms, , was previously treated with Augmentin and steroid injection, will treat with oral prednisone.  Instructed to follow with PCP or ENT.          Take steroids with food.  Follow with primary care provider or ENT if symptoms fail to improve.  Drink plenty of fluids. Get plenty of rest.   Claritin, Zyrtec, or other over-the-counter antihistamine for runny nose, postnasal drip, nasal congestion.    Nasal spray such as Nasacort or Flonase for congestion.  Over-the-counter decongestants such as Sudafed,  phenylephrine or pseudoephedrine.    Sterile nasal saline spray    Warm saltwater gargles for sore throat.  Warm water with honey to help coat the throat.  Throat lozenges.  Chloraseptic spray for worsening sore throat.  Tylenol or ibuprofen as needed for sore throat and fever.  May alternate every 3 hours    Call or return to clinic as needed   Go to the ER with any significant change or worsening of symptoms.   Follow up with your primary care doctor.

## 2024-08-30 NOTE — PATIENT INSTRUCTIONS
Take steroids with food.  Follow with primary care provider or ENT if symptoms fail to improve.  Drink plenty of fluids. Get plenty of rest.   Claritin, Zyrtec, or other over-the-counter antihistamine for runny nose, postnasal drip, nasal congestion.    Nasal spray such as Nasacort or Flonase for congestion.  Over-the-counter decongestants such as Sudafed, phenylephrine or pseudoephedrine.    Sterile nasal saline spray    Warm saltwater gargles for sore throat.  Warm water with honey to help coat the throat.  Throat lozenges.  Chloraseptic spray for worsening sore throat.  Tylenol or ibuprofen as needed for sore throat and fever.  May alternate every 3 hours    Call or return to clinic as needed   Go to the ER with any significant change or worsening of symptoms.   Follow up with your primary care doctor.

## 2024-09-13 ENCOUNTER — TELEPHONE (OUTPATIENT)
Dept: FAMILY MEDICINE | Facility: CLINIC | Age: 54
End: 2024-09-13
Payer: COMMERCIAL

## 2024-09-13 RX ORDER — AMOXICILLIN 500 MG/1
500 TABLET, FILM COATED ORAL EVERY 12 HOURS
Qty: 14 TABLET | Refills: 0 | Status: SHIPPED | OUTPATIENT
Start: 2024-09-13 | End: 2024-09-20

## 2024-09-13 NOTE — TELEPHONE ENCOUNTER
Nathan Crawford MD Hebert, Bailey, LPN  Caller: Unspecified (Today,  8:24 AM)  Rx for amoxicillin 500 mg b.i.d. x1 week sent to pharmacy

## 2024-09-13 NOTE — TELEPHONE ENCOUNTER
Pt is still having a lot of head congestion and pressure in sinuses and ears with runny nose. She has failed OTC mucinex, flonase, advil cold and sinus, xyzal and sudafed PE. She does not feel bad, no other symptoms but cannot get rid of head congestion. Is there anything you can prescribe for her?

## 2024-10-24 DIAGNOSIS — E03.9 HYPOTHYROIDISM, UNSPECIFIED: ICD-10-CM

## 2024-10-24 RX ORDER — LEVOTHYROXINE SODIUM 100 UG/1
100 TABLET ORAL
Qty: 90 TABLET | Refills: 1 | Status: SHIPPED | OUTPATIENT
Start: 2024-10-24

## 2024-11-25 LAB — CRC RECOMMENDATION EXT: NORMAL

## 2025-02-06 ENCOUNTER — OFFICE VISIT (OUTPATIENT)
Dept: FAMILY MEDICINE | Facility: CLINIC | Age: 55
End: 2025-02-06
Payer: COMMERCIAL

## 2025-02-06 VITALS
HEIGHT: 62 IN | HEART RATE: 89 BPM | DIASTOLIC BLOOD PRESSURE: 74 MMHG | OXYGEN SATURATION: 98 % | RESPIRATION RATE: 18 BRPM | TEMPERATURE: 97 F | SYSTOLIC BLOOD PRESSURE: 106 MMHG | BODY MASS INDEX: 29.08 KG/M2 | WEIGHT: 158 LBS

## 2025-02-06 DIAGNOSIS — G60.0 CMT (CHARCOT-MARIE-TOOTH DISEASE): ICD-10-CM

## 2025-02-06 DIAGNOSIS — E03.9 HYPOTHYROIDISM, UNSPECIFIED TYPE: Primary | ICD-10-CM

## 2025-02-06 PROCEDURE — 3008F BODY MASS INDEX DOCD: CPT | Mod: CPTII,,, | Performed by: FAMILY MEDICINE

## 2025-02-06 PROCEDURE — 99214 OFFICE O/P EST MOD 30 MIN: CPT | Mod: ,,, | Performed by: FAMILY MEDICINE

## 2025-02-06 PROCEDURE — 1160F RVW MEDS BY RX/DR IN RCRD: CPT | Mod: CPTII,,, | Performed by: FAMILY MEDICINE

## 2025-02-06 PROCEDURE — 3074F SYST BP LT 130 MM HG: CPT | Mod: CPTII,,, | Performed by: FAMILY MEDICINE

## 2025-02-06 PROCEDURE — 1159F MED LIST DOCD IN RCRD: CPT | Mod: CPTII,,, | Performed by: FAMILY MEDICINE

## 2025-02-06 PROCEDURE — 3078F DIAST BP <80 MM HG: CPT | Mod: CPTII,,, | Performed by: FAMILY MEDICINE

## 2025-02-06 RX ORDER — ESTRADIOL 1 MG/1
1 TABLET ORAL DAILY
COMMUNITY
Start: 2024-12-23

## 2025-02-06 RX ORDER — PROGESTERONE 200 MG/1
200 CAPSULE ORAL NIGHTLY
COMMUNITY
Start: 2024-12-23

## 2025-02-06 NOTE — PROGRESS NOTES
"Subjective:     Patient ID: Siobhan Ko is a 54 y.o. female.    Chief Complaint: 6 month f/u        History of Present Illness    CHIEF COMPLAINT:  Patient presents today for six month follow up    CANCER SURVEILLANCE:  She has a history of prior colonoscopy for which 3 polyps removed by Dr. Justice. Recent colonoscopy revealed three polyps that were removed again.  She is scheduled for surveillance colonoscopy in 3 years     NEUROLOGICAL SYMPTOMS:  She reports progressive worsening of hand tremors, describing them as "severe". She also experiences joint pain and increased trembling, particularly in foot and ankle. She has a history of nerve testing performed by neurologist Oniel Hernandez.    ALLERGIES:  She takes Zyrtec nightly for allergy management. 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      ROS:  Musculoskeletal: +joint pain  Neurological: +tremors  Allergic: +seasonal allergies                Objective:     /74   Pulse 89   Temp 97.1 °F (36.2 °C) (Temporal)   Resp 18   Ht 5' 1.81" (1.57 m)   Wt 71.7 kg (158 lb)   SpO2 98%   BMI 29.08 kg/m²    Physical Exam  Constitutional:       Appearance: Normal appearance.   Neck:      Thyroid: No thyromegaly.   Cardiovascular:      Rate and Rhythm: Normal rate and regular rhythm.      Heart sounds: Normal heart sounds.   Pulmonary:      Effort: Pulmonary effort is normal.      Breath sounds: Normal breath sounds.   Neurological:      Mental Status: She is alert.   Psychiatric:         Mood and Affect: Mood normal.         Behavior: Behavior normal.         Thought Content: Thought content normal.         Judgment: Judgment normal.             Assessment:     Problem List Items Addressed This Visit          Neuro    CMT (Charcot-Helena-Tooth disease)       Endocrine    Hypothyroidism - Primary        Plan:   1. Hypothyroidism, unspecified type  Continue current medication (Synthroid " 100 mcg daily)  TSH with next lab work   Return to clinic with any concerns     2. CMT (Charcot-Helena-Tooth disease)  Continue current care   Keep scheduled appointment with Dr. Hernandez                 This note was generated with the assistance of ambient listening technology. Verbal consent was obtained by the patient and accompanying visitor(s) for the recording of patient appointment to facilitate this note. I attest to having reviewed and edited the generated note for accuracy, though some syntax or spelling errors may persist. Please contact the author of this note for any clarification.

## 2025-03-25 DIAGNOSIS — E03.9 HYPOTHYROIDISM, UNSPECIFIED: ICD-10-CM

## 2025-03-25 RX ORDER — LEVOTHYROXINE SODIUM 100 UG/1
100 TABLET ORAL
Qty: 90 TABLET | Refills: 1 | Status: SHIPPED | OUTPATIENT
Start: 2025-03-25

## 2025-04-22 ENCOUNTER — OFFICE VISIT (OUTPATIENT)
Dept: FAMILY MEDICINE | Facility: CLINIC | Age: 55
End: 2025-04-22
Payer: COMMERCIAL

## 2025-04-22 VITALS
BODY MASS INDEX: 28.39 KG/M2 | HEART RATE: 76 BPM | OXYGEN SATURATION: 98 % | TEMPERATURE: 98 F | SYSTOLIC BLOOD PRESSURE: 114 MMHG | DIASTOLIC BLOOD PRESSURE: 68 MMHG | RESPIRATION RATE: 18 BRPM | WEIGHT: 150.38 LBS | HEIGHT: 61 IN

## 2025-04-22 DIAGNOSIS — R53.83 FATIGUE, UNSPECIFIED TYPE: ICD-10-CM

## 2025-04-22 DIAGNOSIS — J06.9 UPPER RESPIRATORY TRACT INFECTION, UNSPECIFIED TYPE: ICD-10-CM

## 2025-04-22 DIAGNOSIS — H65.01 NON-RECURRENT ACUTE SEROUS OTITIS MEDIA OF RIGHT EAR: Primary | ICD-10-CM

## 2025-04-22 PROBLEM — H92.01 RIGHT EAR PAIN: Status: ACTIVE | Noted: 2025-04-22

## 2025-04-22 PROBLEM — J32.9 SINUSITIS: Status: RESOLVED | Noted: 2022-12-26 | Resolved: 2025-04-22

## 2025-04-22 PROBLEM — R68.83 CHILLS: Status: ACTIVE | Noted: 2025-04-22

## 2025-04-22 PROBLEM — J34.89 SINUS PRESSURE: Status: RESOLVED | Noted: 2022-12-26 | Resolved: 2025-04-22

## 2025-04-22 LAB
BILIRUB SERPL-MCNC: NORMAL MG/DL
BLOOD URINE, POC: 5
CLARITY, POC UA: CLEAR
COLOR, POC UA: NORMAL
GLUCOSE UR QL STRIP: NORMAL
KETONES UR QL STRIP: NORMAL
LEUKOCYTE ESTERASE URINE, POC: NORMAL
NITRITE, POC UA: NORMAL
PH, POC UA: 5
PROTEIN, POC: NORMAL
SPECIFIC GRAVITY, POC UA: 1.01
UROBILINOGEN, POC UA: NORMAL

## 2025-04-22 PROCEDURE — 3078F DIAST BP <80 MM HG: CPT | Mod: CPTII,,,

## 2025-04-22 PROCEDURE — 81002 URINALYSIS NONAUTO W/O SCOPE: CPT | Mod: ,,,

## 2025-04-22 PROCEDURE — 3008F BODY MASS INDEX DOCD: CPT | Mod: CPTII,,,

## 2025-04-22 PROCEDURE — 3074F SYST BP LT 130 MM HG: CPT | Mod: CPTII,,,

## 2025-04-22 PROCEDURE — 1159F MED LIST DOCD IN RCRD: CPT | Mod: CPTII,,,

## 2025-04-22 PROCEDURE — 99214 OFFICE O/P EST MOD 30 MIN: CPT | Mod: ,,,

## 2025-04-22 PROCEDURE — 1160F RVW MEDS BY RX/DR IN RCRD: CPT | Mod: CPTII,,,

## 2025-04-22 RX ORDER — CEFDINIR 300 MG/1
300 CAPSULE ORAL 2 TIMES DAILY
Qty: 14 CAPSULE | Refills: 0 | Status: SHIPPED | OUTPATIENT
Start: 2025-04-22 | End: 2025-04-29

## 2025-04-22 NOTE — PROGRESS NOTES
Family Medicine      Patient ID: [id number]     Chief Complaint: Fever (Reports low grade fever of 99.4% yesterday afternoon with chills), Back Pain (C/O back ache/back pain x since yesterday.), Headache (C/O head pressure/headache), and Otalgia (C/O right ear pain. Patient reports maybe having some fluid in her ear.)    HPI:     Siobhan Ko is a 54 y.o. female here today with general malaise, low-grade fever 99.4, and right ear discomfort that started yesterday.    Ms. Ko reports waking up yesterday morning feeling unwell with tiredness, which she notes is a chronic issue. Her  checked her blood pressure, which was low at 90s/50s. As the day progressed, she developed lower back pain and a low-grade fever of 99.4°F, along with a burning sensation in her throat and increased somnolence. This morning, she woke up with a sinus headache that disturbed her sleep. When she sat up, she had rhinorrhea, which has since resolved. She denies having a fever today. Currently, she has discomfort in her right ear, describing it as feeling full and itchy. She notes that pressing near the ear elicits a slight cough. She denies any current throat pain or tenderness. She kept her appointment to avoid prolonged illness and missing work.    She denies nasal congestion, current fever, dysuria, urinary frequency or urgency, sinus pain, sinus tenderness, shortness of breath, chest pain, and diarrhea.  Denies any known recent sick contacts.    Past Medical History:   Diagnosis Date    Anxiety disorder, unspecified     Appendicitis 1992    Bone spur of foot     left    Broken leg 2020    left    Hypothyroidism, unspecified     Obesity     NATHANAEL on CPAP     Personal history of colon polyps, unspecified 11/25/2024    s/p polypectomy - Dr Ta Justice    Postmenopausal bleeding     Sleep apnea     uses CPAP        Past Surgical History:   Procedure Laterality Date    APPENDECTOMY  1992    COLONOSCOPY      COLONOSCOPY W/  BIOPSIES AND POLYPECTOMY  11/25/2024    Dr Ta Justice    FOOT SURGERY Left     from bone spur    FRACTURE SURGERY Left 2020    leg    HYSTERECTOMY  11-    HYSTEROSCOPIC POLYPECTOMY OF UTERUS N/A 09/20/2022    Dr Song Gomes    HYSTEROSCOPY WITH DILATION AND CURETTAGE OF UTERUS N/A 09/20/2022    Dr Song Gomes    LASIK Bilateral     ROBOT-ASSISTED LAPAROSCOPIC ABDOMINAL HYSTERECTOMY USING DA LISSY XI N/A 11/15/2023    Dr Song Gomes    TUBAL LIGATION  1999        Review of patient's allergies indicates:  No Known Allergies     Patient Care Team:  Nathan Crawford MD as PCP - General (Family Medicine)  Yandy Drummond NP (Internal Medicine)  Song Gomes MD as Consulting Physician (Obstetrics and Gynecology)  Ta Justice MD as Consulting Physician (Gastroenterology)  Oniel Hernandez MD as Consulting Physician (Neurology)     Subjective:     Review of Systems  General: +fever, +chills, +fatigue, negative weight gain, negative weight loss  Eyes: negative vision changes, negative redness, negative discharge  ENT: +ear pain, negative nasal congestion, negative sore throat, +ear pressure, +ear pruritus  Cardiovascular: negative chest pain, negative palpitations, negative lower extremity edema  Respiratory: +cough, negative shortness of breath, negative wheezing  Gastrointestinal: negative abdominal pain, negative nausea, negative vomiting, negative diarrhea, negative constipation, negative blood in stool  Genitourinary: negative dysuria, negative hematuria, negative frequency  Musculoskeletal: negative joint pain, negative muscle pain  Skin: negative rash, negative lesion  Neurological: negative headache, negative dizziness, negative numbness, negative tingling  Psychiatric: negative anxiety, negative depression, negative sleep difficulty     Objective:     Visit Vitals  /68 (BP Location: Right arm, Patient Position: Sitting)   Pulse 76   Temp 97.8 °F (36.6 °C)   Resp 18  "  Ht 5' 1" (1.549 m)   Wt 68.2 kg (150 lb 6.4 oz)   SpO2 98%   BMI 28.42 kg/m²       Physical Exam  Vitals and nursing note reviewed.   Constitutional:       General: She is not in acute distress.     Appearance: She is not toxic-appearing.   HENT:      Right Ear: No decreased hearing noted. No drainage, swelling or tenderness. A middle ear effusion is present. No mastoid tenderness. Tympanic membrane is erythematous and bulging. Tympanic membrane is not perforated or retracted.      Left Ear: Tympanic membrane normal.      Nose: Rhinorrhea present. No congestion. Rhinorrhea is clear.      Right Sinus: No maxillary sinus tenderness or frontal sinus tenderness.      Left Sinus: No maxillary sinus tenderness or frontal sinus tenderness.      Mouth/Throat:      Mouth: Mucous membranes are moist.      Pharynx: Oropharynx is clear. Uvula midline. No pharyngeal swelling, oropharyngeal exudate, posterior oropharyngeal erythema, uvula swelling or postnasal drip.   Cardiovascular:      Rate and Rhythm: Normal rate and regular rhythm.      Heart sounds: Normal heart sounds.   Pulmonary:      Effort: Pulmonary effort is normal. No respiratory distress.      Breath sounds: Normal breath sounds. No stridor. No wheezing, rhonchi or rales.   Abdominal:      General: Abdomen is flat. Bowel sounds are normal. There is no distension.      Palpations: Abdomen is soft.      Tenderness: There is no abdominal tenderness. There is no right CVA tenderness, left CVA tenderness, guarding or rebound.   Musculoskeletal:      Cervical back: Neck supple.      Right lower leg: No edema.      Left lower leg: No edema.   Lymphadenopathy:      Cervical: Cervical adenopathy (bilateral submandibular) present.   Skin:     General: Skin is warm and dry.   Neurological:      General: No focal deficit present.      Mental Status: She is alert and oriented to person, place, and time. Mental status is at baseline.      Motor: No weakness.   Psychiatric:    "      Mood and Affect: Mood normal.         Behavior: Behavior normal.         Thought Content: Thought content normal.         Judgment: Judgment normal.         Labs Reviewed:     Recent Results (from the past 3 weeks)   POCT URINE DIPSTICK WITHOUT MICROSCOPE    Collection Time: 04/22/25  9:15 AM   Result Value Ref Range    Glucose, UA -     Bilirubin, POC -     Ketones, UA -     Spec Grav UA 1.015     Blood, UA 5     pH, UA 5.0     Protein, POC -     Urobilinogen, UA +-     Nitrite, UA -     WBC, UA -     Color, UA Light Yellow     Clarity, UA Clear       Assessment:       ICD-10-CM ICD-9-CM   1. Non-recurrent acute serous otitis media of right ear  H65.01 381.01   2. Upper respiratory tract infection, unspecified type  J06.9 465.9   3. Fatigue, unspecified type  R53.83 780.79        Plan:     1. Non-recurrent acute serous otitis media of right ear  Assessment & Plan:  Start Omnicef 300 mg by mouth b.i.d. x7 days  Educated on importance of completing full course of antibiotic regimen  Treat fever/pain with acetaminophen or ibuprofen as needed -  take according to package directions  Apply warm compress for ear pain.  Ensure you do not insert any foreign body into ear.  Report to ER if you start with worsening symptoms, fever > 101.5, facial swelling/redness, or tenderness around the ear.  Return to clinic with any concerns.    Orders:  -     cefdinir (OMNICEF) 300 MG capsule; Take 1 capsule (300 mg total) by mouth 2 (two) times daily. for 7 days  Dispense: 14 capsule; Refill: 0    2. Upper respiratory tract infection, unspecified type  Assessment & Plan:  Discussed COVID/FLU/RSV testing; patient defers.  Use OTC cold meds for URI symptoms (HTN and DM pt to avoid Sudafed or pseudoephedrine products).  Use OTC Tylenol or Advil for fever or body aches.  Get plenty of rest, eat a healthy diet, avoid alcohol and smoking.  Sore Throat: Use OTC lozenges or throat sprays, gargle with warm salt and water, warm tea with  honey.  Nasal Congestion: Use OTC Mucinex D, humidifier or steamy shower.  Cough: Use Dextromethorphan/Doxylamine Cough Syrup at night.  Report fever greater than 101 F, breathing problems, painful or worsening cough, or changes in mucous (green, yellow, bloody).  Cover your mouth with coughing, avoid sharing cups or lip balm, wash your hand before eating or touching your face.  Return to clinic with any concerns.        3. Fatigue, unspecified type  Assessment & Plan:  Urinalysis reviewed.  Discussed COVID/FLU/RSV testing; patient defers.  ER precautions discussed  Get plenty of rest, eat a healthy diet, avoid alcohol and smoking.  Monitor home BP daily  Report to ER with any hypotension, syncope, fever > 101.5, worsening fatigue, weakness, CP, palpitations, or SOB.  Return to clinic with any concerns.    Orders:  -     POCT URINE DIPSTICK WITHOUT MICROSCOPE         Follow up if symptoms worsen or fail to improve. In addition to their scheduled follow up, the patient has also been instructed to follow up on as needed basis.     This note was generated with the assistance of ambient listening technology. Verbal consent was obtained by the patient and accompanying visitor(s) for the recording of patient appointment to facilitate this note. I attest to having reviewed and edited the generated note for accuracy, though some syntax or spelling errors may persist. Please contact the author of this note for any clarification.       Martha Willson NP

## 2025-04-22 NOTE — ASSESSMENT & PLAN NOTE
Start Omnicef 300 mg by mouth b.i.d. x7 days  Educated on importance of completing full course of antibiotic regimen  Treat fever/pain with acetaminophen or ibuprofen as needed -  take according to package directions  Apply warm compress for ear pain.  Ensure you do not insert any foreign body into ear.  Report to ER if you start with worsening symptoms, fever > 101.5, facial swelling/redness, or tenderness around the ear.  Return to clinic with any concerns.

## 2025-04-22 NOTE — ASSESSMENT & PLAN NOTE
Urinalysis reviewed.  Discussed COVID/FLU/RSV testing; patient defers.  ER precautions discussed  Get plenty of rest, eat a healthy diet, avoid alcohol and smoking.  Monitor home BP daily  Report to ER with any hypotension, syncope, fever > 101.5, worsening fatigue, weakness, CP, palpitations, or SOB.  Return to clinic with any concerns.

## 2025-04-22 NOTE — ASSESSMENT & PLAN NOTE
Discussed COVID/FLU/RSV testing; patient defers.  Use OTC cold meds for URI symptoms (HTN and DM pt to avoid Sudafed or pseudoephedrine products).  Use OTC Tylenol or Advil for fever or body aches.  Get plenty of rest, eat a healthy diet, avoid alcohol and smoking.  Sore Throat: Use OTC lozenges or throat sprays, gargle with warm salt and water, warm tea with honey.  Nasal Congestion: Use OTC Mucinex D, humidifier or steamy shower.  Cough: Use Dextromethorphan/Doxylamine Cough Syrup at night.  Report fever greater than 101 F, breathing problems, painful or worsening cough, or changes in mucous (green, yellow, bloody).  Cover your mouth with coughing, avoid sharing cups or lip balm, wash your hand before eating or touching your face.  Return to clinic with any concerns.

## 2025-05-11 DIAGNOSIS — F41.9 ANXIETY: ICD-10-CM

## 2025-05-12 DIAGNOSIS — I10 HYPERTENSION, UNSPECIFIED TYPE: ICD-10-CM

## 2025-05-12 DIAGNOSIS — E03.9 HYPOTHYROIDISM, UNSPECIFIED TYPE: ICD-10-CM

## 2025-05-12 DIAGNOSIS — Z11.4 ENCOUNTER FOR HIV (HUMAN IMMUNODEFICIENCY VIRUS) TEST: ICD-10-CM

## 2025-05-12 DIAGNOSIS — E78.5 HYPERLIPIDEMIA, UNSPECIFIED HYPERLIPIDEMIA TYPE: ICD-10-CM

## 2025-05-12 DIAGNOSIS — Z11.59 NEED FOR HEPATITIS C SCREENING TEST: ICD-10-CM

## 2025-05-12 DIAGNOSIS — Z00.00 WELLNESS EXAMINATION: Primary | ICD-10-CM

## 2025-05-12 RX ORDER — VENLAFAXINE HYDROCHLORIDE 150 MG/1
150 CAPSULE, EXTENDED RELEASE ORAL DAILY
Qty: 90 CAPSULE | Refills: 1 | Status: SHIPPED | OUTPATIENT
Start: 2025-05-12 | End: 2025-11-08

## 2025-06-06 ENCOUNTER — RESULTS FOLLOW-UP (OUTPATIENT)
Dept: FAMILY MEDICINE | Facility: CLINIC | Age: 55
End: 2025-06-06

## 2025-06-06 ENCOUNTER — LAB VISIT (OUTPATIENT)
Dept: LAB | Facility: HOSPITAL | Age: 55
End: 2025-06-06
Attending: FAMILY MEDICINE
Payer: COMMERCIAL

## 2025-06-06 DIAGNOSIS — Z00.00 WELLNESS EXAMINATION: ICD-10-CM

## 2025-06-06 DIAGNOSIS — N95.1 SYMPTOMATIC MENOPAUSAL OR FEMALE CLIMACTERIC STATES: ICD-10-CM

## 2025-06-06 DIAGNOSIS — Z11.4 ENCOUNTER FOR HIV (HUMAN IMMUNODEFICIENCY VIRUS) TEST: ICD-10-CM

## 2025-06-06 DIAGNOSIS — R73.9 BLOOD GLUCOSE ELEVATED: ICD-10-CM

## 2025-06-06 DIAGNOSIS — E03.9 HYPOTHYROIDISM, UNSPECIFIED TYPE: ICD-10-CM

## 2025-06-06 DIAGNOSIS — I51.9 MYXEDEMA HEART DISEASE: ICD-10-CM

## 2025-06-06 DIAGNOSIS — E03.9 MYXEDEMA HEART DISEASE: ICD-10-CM

## 2025-06-06 DIAGNOSIS — I10 HYPERTENSION, UNSPECIFIED TYPE: ICD-10-CM

## 2025-06-06 DIAGNOSIS — Z11.59 NEED FOR HEPATITIS C SCREENING TEST: ICD-10-CM

## 2025-06-06 DIAGNOSIS — E03.9 HYPOTHYROIDISM, UNSPECIFIED: ICD-10-CM

## 2025-06-06 DIAGNOSIS — E34.9 ENDOCRINE DISORDER RELATED TO PUBERTY: ICD-10-CM

## 2025-06-06 DIAGNOSIS — Z79.899 POLYPHARMACY: Primary | ICD-10-CM

## 2025-06-06 DIAGNOSIS — E78.5 HYPERLIPIDEMIA, UNSPECIFIED HYPERLIPIDEMIA TYPE: ICD-10-CM

## 2025-06-06 LAB
ALBUMIN SERPL-MCNC: 3.7 G/DL (ref 3.5–5)
ALBUMIN/GLOB SERPL: 1.1 RATIO (ref 1.1–2)
ALP SERPL-CCNC: 60 UNIT/L (ref 40–150)
ALT SERPL-CCNC: 30 UNIT/L (ref 0–55)
ANION GAP SERPL CALC-SCNC: 6 MEQ/L
AST SERPL-CCNC: 22 UNIT/L (ref 11–45)
BASOPHILS # BLD AUTO: 0.02 X10(3)/MCL
BASOPHILS NFR BLD AUTO: 0.3 %
BILIRUB SERPL-MCNC: 0.5 MG/DL
BUN SERPL-MCNC: 9 MG/DL (ref 9.8–20.1)
CALCIUM SERPL-MCNC: 9.4 MG/DL (ref 8.4–10.2)
CHLORIDE SERPL-SCNC: 107 MMOL/L (ref 98–107)
CHOLEST SERPL-MCNC: 167 MG/DL
CHOLEST/HDLC SERPL: 5 {RATIO} (ref 0–5)
CO2 SERPL-SCNC: 28 MMOL/L (ref 22–29)
CREAT SERPL-MCNC: 0.65 MG/DL (ref 0.55–1.02)
CREAT/UREA NIT SERPL: 14
EOSINOPHIL # BLD AUTO: 0.06 X10(3)/MCL (ref 0–0.9)
EOSINOPHIL NFR BLD AUTO: 0.9 %
ERYTHROCYTE [DISTWIDTH] IN BLOOD BY AUTOMATED COUNT: 13.1 % (ref 11.5–17)
EST. AVERAGE GLUCOSE BLD GHB EST-MCNC: 88.2 MG/DL
ESTRADIOL SERPL HS-MCNC: 104 PG/ML
FSH SERPL-ACNC: 10.7 MIU/ML
GFR SERPLBLD CREATININE-BSD FMLA CKD-EPI: >60 ML/MIN/1.73/M2
GLOBULIN SER-MCNC: 3.3 GM/DL (ref 2.4–3.5)
GLUCOSE SERPL-MCNC: 86 MG/DL (ref 74–100)
HBA1C MFR BLD: 4.7 %
HCT VFR BLD AUTO: 45.5 % (ref 37–47)
HCV AB SERPL QL IA: NONREACTIVE
HDLC SERPL-MCNC: 35 MG/DL (ref 35–60)
HGB BLD-MCNC: 15 G/DL (ref 12–16)
HIV 1+2 AB+HIV1 P24 AG SERPL QL IA: NONREACTIVE
IMM GRANULOCYTES # BLD AUTO: 0.01 X10(3)/MCL (ref 0–0.04)
IMM GRANULOCYTES NFR BLD AUTO: 0.2 %
LDLC SERPL CALC-MCNC: 120 MG/DL (ref 50–140)
LYMPHOCYTES # BLD AUTO: 2.42 X10(3)/MCL (ref 0.6–4.6)
LYMPHOCYTES NFR BLD AUTO: 38 %
MCH RBC QN AUTO: 31.7 PG (ref 27–31)
MCHC RBC AUTO-ENTMCNC: 33 G/DL (ref 33–36)
MCV RBC AUTO: 96.2 FL (ref 80–94)
MONOCYTES # BLD AUTO: 0.5 X10(3)/MCL (ref 0.1–1.3)
MONOCYTES NFR BLD AUTO: 7.8 %
NEUTROPHILS # BLD AUTO: 3.36 X10(3)/MCL (ref 2.1–9.2)
NEUTROPHILS NFR BLD AUTO: 52.8 %
NRBC BLD AUTO-RTO: 0 %
PLATELET # BLD AUTO: 307 X10(3)/MCL (ref 130–400)
PMV BLD AUTO: 9.6 FL (ref 7.4–10.4)
POTASSIUM SERPL-SCNC: 5.3 MMOL/L (ref 3.5–5.1)
PROT SERPL-MCNC: 7 GM/DL (ref 6.4–8.3)
RBC # BLD AUTO: 4.73 X10(6)/MCL (ref 4.2–5.4)
SODIUM SERPL-SCNC: 141 MMOL/L (ref 136–145)
T3FREE SERPL-MCNC: 3.56 PG/ML (ref 1.58–3.91)
T4 FREE SERPL-MCNC: 1.27 NG/DL (ref 0.7–1.48)
TESTOST SERPL-MCNC: 515.63 NG/DL (ref 12.4–35.75)
TRIGL SERPL-MCNC: 59 MG/DL (ref 37–140)
TSH SERPL-ACNC: <0.008 UIU/ML (ref 0.35–4.94)
VLDLC SERPL CALC-MCNC: 12 MG/DL
WBC # BLD AUTO: 6.37 X10(3)/MCL (ref 4.5–11.5)

## 2025-06-06 PROCEDURE — 82670 ASSAY OF TOTAL ESTRADIOL: CPT

## 2025-06-06 PROCEDURE — 84403 ASSAY OF TOTAL TESTOSTERONE: CPT

## 2025-06-06 PROCEDURE — 84439 ASSAY OF FREE THYROXINE: CPT

## 2025-06-06 PROCEDURE — 84481 FREE ASSAY (FT-3): CPT

## 2025-06-06 PROCEDURE — 80061 LIPID PANEL: CPT

## 2025-06-06 PROCEDURE — 87389 HIV-1 AG W/HIV-1&-2 AB AG IA: CPT

## 2025-06-06 PROCEDURE — 86803 HEPATITIS C AB TEST: CPT

## 2025-06-06 PROCEDURE — 36415 COLL VENOUS BLD VENIPUNCTURE: CPT

## 2025-06-06 PROCEDURE — 84443 ASSAY THYROID STIM HORMONE: CPT

## 2025-06-06 PROCEDURE — 85025 COMPLETE CBC W/AUTO DIFF WBC: CPT

## 2025-06-06 PROCEDURE — 83036 HEMOGLOBIN GLYCOSYLATED A1C: CPT

## 2025-06-06 PROCEDURE — 83001 ASSAY OF GONADOTROPIN (FSH): CPT

## 2025-06-06 PROCEDURE — 80053 COMPREHEN METABOLIC PANEL: CPT

## 2025-06-06 RX ORDER — LEVOTHYROXINE SODIUM 88 UG/1
88 TABLET ORAL
Qty: 30 TABLET | Refills: 3 | Status: SHIPPED | OUTPATIENT
Start: 2025-06-06 | End: 2025-10-04

## 2025-06-06 NOTE — PROGRESS NOTES
Please inform patient of results.     1. Decrease Synthroid 88 mcg q.day.  Rx sent to pharmacy.  Repeat TSH in 6 weeks.  Order placed

## 2025-06-09 ENCOUNTER — LAB VISIT (OUTPATIENT)
Dept: LAB | Facility: HOSPITAL | Age: 55
End: 2025-06-09
Attending: NURSE PRACTITIONER
Payer: COMMERCIAL

## 2025-06-09 DIAGNOSIS — Z79.899 POLYPHARMACY: Primary | ICD-10-CM

## 2025-06-09 LAB — INSULIN SERPL-MCNC: 5.9 UU/ML

## 2025-06-09 PROCEDURE — 83525 ASSAY OF INSULIN: CPT

## 2025-06-09 PROCEDURE — 36415 COLL VENOUS BLD VENIPUNCTURE: CPT

## 2025-07-05 NOTE — TRANSFER OF CARE
"Anesthesia Transfer of Care Note    Patient: Siobhan Ko    Procedure(s) Performed: Procedure(s) (LRB):  XI ROBOTIC HYSTERECTOMY (N/A)    Patient location: PACU    Anesthesia Type: general    Transport from OR: Transported from OR on room air with adequate spontaneous ventilation    Post pain: adequate analgesia    Post assessment: no apparent anesthetic complications    Post vital signs: stable    Level of consciousness: awake and alert    Nausea/Vomiting: no nausea/vomiting    Complications: none    Transfer of care protocol was followed      Last vitals: Visit Vitals  /64 (BP Location: Right arm, Patient Position: Lying)   Pulse 85   Temp 36 °C (96.8 °F)   Resp 14   Ht 5' 2" (1.575 m)   Wt 82.4 kg (181 lb 10.5 oz)   SpO2 (!) 93%   Breastfeeding No   BMI 33.23 kg/m²     "
Initial (On Arrival)

## (undated) DEVICE — GLOVE PROTEXIS HYDROGEL SZ8

## (undated) DEVICE — OBTURATOR BLADELESS 8MM XI

## (undated) DEVICE — HEMOSTAT SURGICEL PWD 3G

## (undated) DEVICE — HANDPIECE ARGYLE YANKAUER 34FR

## (undated) DEVICE — SEAL LENS SCOPE MYOSURE

## (undated) DEVICE — Device

## (undated) DEVICE — ELECTRODE PATIENT RETURN DISP

## (undated) DEVICE — SUT MCRYL PLUS 4-0 PS2 27IN

## (undated) DEVICE — ADHESIVE DERMABOND ADVANCED

## (undated) DEVICE — DRAPE ARM DAVINCI XI

## (undated) DEVICE — DRAPE TOP 53X102IN

## (undated) DEVICE — HANDLE DEVON RIGID OR LIGHT

## (undated) DEVICE — PAD PREP CUFFED NS 24X48IN

## (undated) DEVICE — SEE MEDLINE ITEM 154981

## (undated) DEVICE — LEGGING SURG CONV 43X28IN

## (undated) DEVICE — COVER TABLE HVY DTY 60X90IN

## (undated) DEVICE — DRAPE COLUMN DAVINCI XI

## (undated) DEVICE — TRAY CATH FOL SIL URIMTR 16FR

## (undated) DEVICE — DRESSING TELFA N ADH 3X8IN

## (undated) DEVICE — GLOVE PROTEXIS PI SYN SURG 8.0

## (undated) DEVICE — GOWN X-LG STERILE BACK

## (undated) DEVICE — SYR DISP LL 5CC

## (undated) DEVICE — SOL NORMAL USPCA 0.9%

## (undated) DEVICE — SEAL UNIVERSAL 5MM-8MM XI

## (undated) DEVICE — COVER TIP CURVED SCISSORS XI

## (undated) DEVICE — PACK GYN LAPAROSCOPY HZD

## (undated) DEVICE — COVER MAYO STAND REINFRCD 30

## (undated) DEVICE — APPLICATOR SURGICEL ENDOSCOPIC

## (undated) DEVICE — CARTRIDGE BABCOCK GRASPER 5X45

## (undated) DEVICE — SET TRI-LUMEN FILTERED TUBE

## (undated) DEVICE — PAD CURITY MATERNITY PERI

## (undated) DEVICE — PAD PINK TRENDELENBURG POS XL

## (undated) DEVICE — TUBE AQUILEX INFLOW

## (undated) DEVICE — DRAPE UNDER BUTTOCKS SUC PORT

## (undated) DEVICE — GLOVE PROTEXIS HYDROGEL SZ7

## (undated) DEVICE — SYR 10CC LUER LOCK

## (undated) DEVICE — GLOVE PROTEXIS HYDROGEL SZ6.5

## (undated) DEVICE — SOL CLEARIFY VISUALIZATION LAP

## (undated) DEVICE — GOWN POLY REINF X-LONG 2XL

## (undated) DEVICE — TOWEL OR DISP STRL BLUE 4/PK

## (undated) DEVICE — PORT ACCESS 8MM W/120MM LOW

## (undated) DEVICE — SOL IRR NACL .9% 3000ML

## (undated) DEVICE — IRRIGATOR SUCTION W/TIP

## (undated) DEVICE — TRAY SKIN SCRUB WET PREMIUM

## (undated) DEVICE — TUBE SUCTION MEDI-VAC STERILE

## (undated) DEVICE — DEVICE MYOSURE REACH SYS

## (undated) DEVICE — SPONGE LAP STRL 18X18IN

## (undated) DEVICE — GLOVE PROTEXIS HYDROGEL SZ7.5

## (undated) DEVICE — GAUZE VISTEC XR DTECT 16 4X4IN

## (undated) DEVICE — GLOVE PROTEXIS BLUE LATEX 7

## (undated) DEVICE — KIT SURGICAL TURNOVER

## (undated) DEVICE — SUPPORT ULNA NERVE PROTECTOR

## (undated) DEVICE — NDL HYPO REG 25G X 1 1/2

## (undated) DEVICE — PAD SANITARY HVY ABSRB UNSCNTD

## (undated) DEVICE — SEALER VESSEL EXTEND

## (undated) DEVICE — TUBE AQUILEX OUTFLOW